# Patient Record
Sex: MALE | Race: WHITE | NOT HISPANIC OR LATINO | Employment: OTHER | URBAN - METROPOLITAN AREA
[De-identification: names, ages, dates, MRNs, and addresses within clinical notes are randomized per-mention and may not be internally consistent; named-entity substitution may affect disease eponyms.]

---

## 2017-04-28 ENCOUNTER — ANESTHESIA EVENT (OUTPATIENT)
Dept: SURGERY | Facility: HOSPITAL | Age: 57
End: 2017-04-28
Payer: MEDICAID

## 2017-04-28 ENCOUNTER — HOSPITAL ENCOUNTER (OUTPATIENT)
Facility: HOSPITAL | Age: 57
Discharge: HOME | End: 2017-04-28
Attending: EMERGENCY MEDICINE | Admitting: ORTHOPAEDIC SURGERY
Payer: MEDICAID

## 2017-04-28 ENCOUNTER — ANESTHESIA (OUTPATIENT)
Dept: SURGERY | Facility: HOSPITAL | Age: 57
End: 2017-04-28
Payer: MEDICAID

## 2017-04-28 ENCOUNTER — SURGERY (OUTPATIENT)
Age: 57
End: 2017-04-28

## 2017-04-28 VITALS
OXYGEN SATURATION: 100 % | BODY MASS INDEX: 28.14 KG/M2 | WEIGHT: 175.06 LBS | DIASTOLIC BLOOD PRESSURE: 91 MMHG | RESPIRATION RATE: 22 BRPM | HEART RATE: 81 BPM | SYSTOLIC BLOOD PRESSURE: 167 MMHG | HEIGHT: 66 IN | TEMPERATURE: 98 F

## 2017-04-28 DIAGNOSIS — S66.922A LACERATION OF LEFT WRIST WITH TENDON INVOLVEMENT, INITIAL ENCOUNTER: Primary | ICD-10-CM

## 2017-04-28 DIAGNOSIS — T14.90XA TRAUMA: ICD-10-CM

## 2017-04-28 DIAGNOSIS — S61.512A LACERATION OF LEFT WRIST WITH TENDON INVOLVEMENT, INITIAL ENCOUNTER: Primary | ICD-10-CM

## 2017-04-28 LAB
ALBUMIN SERPL BCP-MCNC: 4 G/DL
ALP SERPL-CCNC: 56 U/L
ALT SERPL W/O P-5'-P-CCNC: 16 U/L
ANION GAP SERPL CALC-SCNC: 9 MMOL/L
APTT BLDCRRT: 23.6 SEC
AST SERPL-CCNC: 22 U/L
BASOPHILS # BLD AUTO: 0.05 K/UL
BASOPHILS NFR BLD: 0.5 %
BILIRUB SERPL-MCNC: 0.6 MG/DL
BUN SERPL-MCNC: 13 MG/DL
CALCIUM SERPL-MCNC: 9.5 MG/DL
CHLORIDE SERPL-SCNC: 108 MMOL/L
CO2 SERPL-SCNC: 25 MMOL/L
CREAT SERPL-MCNC: 1.1 MG/DL
DIFFERENTIAL METHOD: ABNORMAL
EOSINOPHIL # BLD AUTO: 0.3 K/UL
EOSINOPHIL NFR BLD: 2.7 %
ERYTHROCYTE [DISTWIDTH] IN BLOOD BY AUTOMATED COUNT: 12.9 %
EST. GFR  (AFRICAN AMERICAN): >60 ML/MIN/1.73 M^2
EST. GFR  (NON AFRICAN AMERICAN): >60 ML/MIN/1.73 M^2
GLUCOSE SERPL-MCNC: 105 MG/DL
HCT VFR BLD AUTO: 39.9 %
HGB BLD-MCNC: 14.2 G/DL
INR PPP: 1
LYMPHOCYTES # BLD AUTO: 4 K/UL
LYMPHOCYTES NFR BLD: 37.7 %
MCH RBC QN AUTO: 30.6 PG
MCHC RBC AUTO-ENTMCNC: 35.6 %
MCV RBC AUTO: 86 FL
MONOCYTES # BLD AUTO: 0.8 K/UL
MONOCYTES NFR BLD: 8 %
NEUTROPHILS # BLD AUTO: 5.4 K/UL
NEUTROPHILS NFR BLD: 50.9 %
PLATELET # BLD AUTO: 286 K/UL
PMV BLD AUTO: 10.2 FL
POTASSIUM SERPL-SCNC: 4.2 MMOL/L
PROT SERPL-MCNC: 7.1 G/DL
PROTHROMBIN TIME: 10.1 SEC
RBC # BLD AUTO: 4.64 M/UL
SODIUM SERPL-SCNC: 142 MMOL/L
WBC # BLD AUTO: 10.52 K/UL

## 2017-04-28 PROCEDURE — 25000003 PHARM REV CODE 250: Performed by: EMERGENCY MEDICINE

## 2017-04-28 PROCEDURE — 90471 IMMUNIZATION ADMIN: CPT | Performed by: EMERGENCY MEDICINE

## 2017-04-28 PROCEDURE — 37000009 HC ANESTHESIA EA ADD 15 MINS: Performed by: ORTHOPAEDIC SURGERY

## 2017-04-28 PROCEDURE — 25000003 PHARM REV CODE 250: Performed by: NURSE ANESTHETIST, CERTIFIED REGISTERED

## 2017-04-28 PROCEDURE — 85610 PROTHROMBIN TIME: CPT

## 2017-04-28 PROCEDURE — 80053 COMPREHEN METABOLIC PANEL: CPT

## 2017-04-28 PROCEDURE — D9220A PRA ANESTHESIA: Mod: CRNA,,, | Performed by: NURSE ANESTHETIST, CERTIFIED REGISTERED

## 2017-04-28 PROCEDURE — 63600175 PHARM REV CODE 636 W HCPCS: Performed by: EMERGENCY MEDICINE

## 2017-04-28 PROCEDURE — 63600175 PHARM REV CODE 636 W HCPCS

## 2017-04-28 PROCEDURE — 71000033 HC RECOVERY, INTIAL HOUR: Performed by: ORTHOPAEDIC SURGERY

## 2017-04-28 PROCEDURE — 96375 TX/PRO/DX INJ NEW DRUG ADDON: CPT

## 2017-04-28 PROCEDURE — 36000706: Performed by: ORTHOPAEDIC SURGERY

## 2017-04-28 PROCEDURE — 99284 EMERGENCY DEPT VISIT MOD MDM: CPT | Mod: 25

## 2017-04-28 PROCEDURE — G0378 HOSPITAL OBSERVATION PER HR: HCPCS

## 2017-04-28 PROCEDURE — 85730 THROMBOPLASTIN TIME PARTIAL: CPT

## 2017-04-28 PROCEDURE — 90715 TDAP VACCINE 7 YRS/> IM: CPT | Performed by: EMERGENCY MEDICINE

## 2017-04-28 PROCEDURE — 96365 THER/PROPH/DIAG IV INF INIT: CPT

## 2017-04-28 PROCEDURE — 36000707: Performed by: ORTHOPAEDIC SURGERY

## 2017-04-28 PROCEDURE — 85025 COMPLETE CBC W/AUTO DIFF WBC: CPT

## 2017-04-28 PROCEDURE — 37000008 HC ANESTHESIA 1ST 15 MINUTES: Performed by: ORTHOPAEDIC SURGERY

## 2017-04-28 PROCEDURE — D9220A PRA ANESTHESIA: Mod: ANES,,, | Performed by: ANESTHESIOLOGY

## 2017-04-28 PROCEDURE — 63600175 PHARM REV CODE 636 W HCPCS: Performed by: NURSE ANESTHETIST, CERTIFIED REGISTERED

## 2017-04-28 RX ORDER — ONDANSETRON 2 MG/ML
INJECTION INTRAMUSCULAR; INTRAVENOUS
Status: DISCONTINUED | OUTPATIENT
Start: 2017-04-28 | End: 2017-04-28

## 2017-04-28 RX ORDER — CEPHALEXIN 500 MG/1
500 CAPSULE ORAL EVERY 6 HOURS
Qty: 40 CAPSULE | Refills: 0 | Status: SHIPPED | OUTPATIENT
Start: 2017-04-28 | End: 2023-06-28

## 2017-04-28 RX ORDER — GLYCOPYRROLATE 0.2 MG/ML
INJECTION INTRAMUSCULAR; INTRAVENOUS
Status: DISCONTINUED | OUTPATIENT
Start: 2017-04-28 | End: 2017-04-28

## 2017-04-28 RX ORDER — OXYCODONE AND ACETAMINOPHEN 7.5; 325 MG/1; MG/1
1 TABLET ORAL EVERY 4 HOURS PRN
Qty: 40 TABLET | Refills: 0 | Status: SHIPPED | OUTPATIENT
Start: 2017-04-28 | End: 2017-05-08

## 2017-04-28 RX ORDER — PROPOFOL 10 MG/ML
VIAL (ML) INTRAVENOUS
Status: DISCONTINUED | OUTPATIENT
Start: 2017-04-28 | End: 2017-04-28

## 2017-04-28 RX ORDER — LORAZEPAM 2 MG/ML
0.25 INJECTION INTRAMUSCULAR ONCE AS NEEDED
Status: CANCELLED | OUTPATIENT
Start: 2017-04-28 | End: 2017-04-28

## 2017-04-28 RX ORDER — METOCLOPRAMIDE HYDROCHLORIDE 5 MG/ML
10 INJECTION INTRAMUSCULAR; INTRAVENOUS EVERY 10 MIN PRN
Status: CANCELLED | OUTPATIENT
Start: 2017-04-28

## 2017-04-28 RX ORDER — MORPHINE SULFATE 10 MG/ML
6 INJECTION INTRAMUSCULAR; INTRAVENOUS; SUBCUTANEOUS
Status: COMPLETED | OUTPATIENT
Start: 2017-04-28 | End: 2017-04-28

## 2017-04-28 RX ORDER — CEFAZOLIN SODIUM 1 G/50ML
SOLUTION INTRAVENOUS
Status: DISCONTINUED
Start: 2017-04-28 | End: 2017-04-28 | Stop reason: WASHOUT

## 2017-04-28 RX ORDER — METOCLOPRAMIDE HYDROCHLORIDE 5 MG/ML
INJECTION INTRAMUSCULAR; INTRAVENOUS
Status: DISCONTINUED | OUTPATIENT
Start: 2017-04-28 | End: 2017-04-28

## 2017-04-28 RX ORDER — BISACODYL 10 MG
10 SUPPOSITORY, RECTAL RECTAL DAILY PRN
Status: DISCONTINUED | OUTPATIENT
Start: 2017-04-28 | End: 2017-04-29 | Stop reason: HOSPADM

## 2017-04-28 RX ORDER — DIPHENHYDRAMINE HYDROCHLORIDE 50 MG/ML
25 INJECTION INTRAMUSCULAR; INTRAVENOUS EVERY 6 HOURS PRN
Status: CANCELLED | OUTPATIENT
Start: 2017-04-28

## 2017-04-28 RX ORDER — CEFAZOLIN SODIUM 1 G/50ML
1 SOLUTION INTRAVENOUS ONCE
Status: COMPLETED | OUTPATIENT
Start: 2017-04-28 | End: 2017-04-28

## 2017-04-28 RX ORDER — HYDROMORPHONE HYDROCHLORIDE 2 MG/ML
0.2 INJECTION, SOLUTION INTRAMUSCULAR; INTRAVENOUS; SUBCUTANEOUS EVERY 5 MIN PRN
Status: CANCELLED | OUTPATIENT
Start: 2017-04-28

## 2017-04-28 RX ORDER — CEFAZOLIN SODIUM 2 G/50ML
SOLUTION INTRAVENOUS
Status: COMPLETED
Start: 2017-04-28 | End: 2017-04-28

## 2017-04-28 RX ORDER — HYDROMORPHONE HYDROCHLORIDE 2 MG/ML
1 INJECTION, SOLUTION INTRAMUSCULAR; INTRAVENOUS; SUBCUTANEOUS
Status: COMPLETED | OUTPATIENT
Start: 2017-04-28 | End: 2017-04-28

## 2017-04-28 RX ORDER — MEPERIDINE HYDROCHLORIDE 50 MG/ML
12.5 INJECTION INTRAMUSCULAR; INTRAVENOUS; SUBCUTANEOUS ONCE AS NEEDED
Status: CANCELLED | OUTPATIENT
Start: 2017-04-28 | End: 2017-04-28

## 2017-04-28 RX ORDER — FENTANYL CITRATE 50 UG/ML
INJECTION, SOLUTION INTRAMUSCULAR; INTRAVENOUS
Status: DISCONTINUED | OUTPATIENT
Start: 2017-04-28 | End: 2017-04-28

## 2017-04-28 RX ORDER — LIDOCAINE HCL/PF 100 MG/5ML
SYRINGE (ML) INTRAVENOUS
Status: DISCONTINUED | OUTPATIENT
Start: 2017-04-28 | End: 2017-04-28

## 2017-04-28 RX ORDER — MIDAZOLAM HYDROCHLORIDE 1 MG/ML
INJECTION, SOLUTION INTRAMUSCULAR; INTRAVENOUS
Status: DISCONTINUED | OUTPATIENT
Start: 2017-04-28 | End: 2017-04-28

## 2017-04-28 RX ORDER — PANTOPRAZOLE SODIUM 40 MG/10ML
40 INJECTION, POWDER, LYOPHILIZED, FOR SOLUTION INTRAVENOUS DAILY
Status: DISCONTINUED | OUTPATIENT
Start: 2017-04-29 | End: 2017-04-29 | Stop reason: HOSPADM

## 2017-04-28 RX ORDER — MORPHINE SULFATE 10 MG/ML
4 INJECTION INTRAMUSCULAR; INTRAVENOUS; SUBCUTANEOUS EVERY 4 HOURS PRN
Status: DISCONTINUED | OUTPATIENT
Start: 2017-04-28 | End: 2017-04-29 | Stop reason: HOSPADM

## 2017-04-28 RX ORDER — SODIUM CHLORIDE, SODIUM LACTATE, POTASSIUM CHLORIDE, CALCIUM CHLORIDE 600; 310; 30; 20 MG/100ML; MG/100ML; MG/100ML; MG/100ML
INJECTION, SOLUTION INTRAVENOUS CONTINUOUS PRN
Status: DISCONTINUED | OUTPATIENT
Start: 2017-04-28 | End: 2017-04-28

## 2017-04-28 RX ORDER — HYDROCODONE BITARTRATE AND ACETAMINOPHEN 5; 325 MG/1; MG/1
1 TABLET ORAL EVERY 4 HOURS PRN
Status: DISCONTINUED | OUTPATIENT
Start: 2017-04-28 | End: 2017-04-29 | Stop reason: HOSPADM

## 2017-04-28 RX ORDER — SODIUM CHLORIDE 9 MG/ML
INJECTION, SOLUTION INTRAVENOUS CONTINUOUS
Status: DISCONTINUED | OUTPATIENT
Start: 2017-04-28 | End: 2017-04-29 | Stop reason: HOSPADM

## 2017-04-28 RX ADMIN — FENTANYL CITRATE 25 MCG: 50 INJECTION INTRAMUSCULAR; INTRAVENOUS at 09:04

## 2017-04-28 RX ADMIN — MIDAZOLAM HYDROCHLORIDE 2 MG: 1 INJECTION, SOLUTION INTRAMUSCULAR; INTRAVENOUS at 08:04

## 2017-04-28 RX ADMIN — GLYCOPYRROLATE 0.2 MG: 0.2 INJECTION, SOLUTION INTRAMUSCULAR; INTRAVENOUS at 08:04

## 2017-04-28 RX ADMIN — SODIUM CHLORIDE: 0.9 INJECTION, SOLUTION INTRAVENOUS at 04:04

## 2017-04-28 RX ADMIN — SODIUM CHLORIDE, SODIUM LACTATE, POTASSIUM CHLORIDE, AND CALCIUM CHLORIDE: .6; .31; .03; .02 INJECTION, SOLUTION INTRAVENOUS at 08:04

## 2017-04-28 RX ADMIN — CLOSTRIDIUM TETANI TOXOID ANTIGEN (FORMALDEHYDE INACTIVATED), CORYNEBACTERIUM DIPHTHERIAE TOXOID ANTIGEN (FORMALDEHYDE INACTIVATED), BORDETELLA PERTUSSIS TOXOID ANTIGEN (GLUTARALDEHYDE INACTIVATED), BORDETELLA PERTUSSIS FILAMENTOUS HEMAGGLUTININ ANTIGEN (FORMALDEHYDE INACTIVATED), BORDETELLA PERTUSSIS PERTACTIN ANTIGEN, AND BORDETELLA PERTUSSIS FIMBRIAE 2/3 ANTIGEN 0.5 ML: 5; 2; 2.5; 5; 3; 5 INJECTION, SUSPENSION INTRAMUSCULAR at 02:04

## 2017-04-28 RX ADMIN — METOCLOPRAMIDE 10 MG: 5 INJECTION, SOLUTION INTRAMUSCULAR; INTRAVENOUS at 08:04

## 2017-04-28 RX ADMIN — FENTANYL CITRATE 50 MCG: 50 INJECTION INTRAMUSCULAR; INTRAVENOUS at 08:04

## 2017-04-28 RX ADMIN — LIDOCAINE HYDROCHLORIDE 100 MG: 20 INJECTION, SOLUTION INTRAVENOUS at 08:04

## 2017-04-28 RX ADMIN — CEFAZOLIN SODIUM 2 G: 2 SOLUTION INTRAVENOUS at 09:04

## 2017-04-28 RX ADMIN — FENTANYL CITRATE 50 MCG: 50 INJECTION INTRAMUSCULAR; INTRAVENOUS at 10:04

## 2017-04-28 RX ADMIN — PROPOFOL 20 MG: 10 INJECTION, EMULSION INTRAVENOUS at 09:04

## 2017-04-28 RX ADMIN — ONDANSETRON 4 MG: 2 INJECTION, SOLUTION INTRAMUSCULAR; INTRAVENOUS at 10:04

## 2017-04-28 RX ADMIN — SODIUM CHLORIDE, SODIUM LACTATE, POTASSIUM CHLORIDE, AND CALCIUM CHLORIDE: .6; .31; .03; .02 INJECTION, SOLUTION INTRAVENOUS at 10:04

## 2017-04-28 RX ADMIN — MORPHINE SULFATE 6 MG: 10 INJECTION INTRAVENOUS at 02:04

## 2017-04-28 RX ADMIN — CEFAZOLIN SODIUM 1 G: 1 SOLUTION INTRAVENOUS at 02:04

## 2017-04-28 RX ADMIN — HYDROMORPHONE HYDROCHLORIDE 1 MG: 2 INJECTION INTRAMUSCULAR; INTRAVENOUS; SUBCUTANEOUS at 03:04

## 2017-04-28 RX ADMIN — FENTANYL CITRATE 50 MCG: 50 INJECTION INTRAMUSCULAR; INTRAVENOUS at 09:04

## 2017-04-28 RX ADMIN — PROPOFOL 180 MG: 10 INJECTION, EMULSION INTRAVENOUS at 08:04

## 2017-04-28 NOTE — ED PROVIDER NOTES
"Encounter Date: 4/28/2017    SCRIBE #1 NOTE: I, Emelina Mcnamara, am scribing for, and in the presence of,  Ilana Mayes MD. I have scribed the following portions of the note - Other sections scribed: HPI, ROS.       History     Chief Complaint   Patient presents with    Laceration     " The ceramic from the toilet broke and sliced my wrist open ( left)."     Review of patient's allergies indicates:  No Known Allergies  HPI Comments: CC: Laceration    HPI: 56 year old male, smoker with no reported PMHx presents to the ED for emergent evaluation of a laceration to the left volar, ulnar wrist with exposed tendon and muscle belly. Patient reports he was moving a toilet bowl that cracked and "sliced his wrist open" just PTA. Pain is constant and severe (10/10). No reported prior treatment. Pain is exacerbated when patient tries to make a fist stating "it feels like a knot is in my wrist". Patient unsure of last tetanus. Patient otherwise denies nausea, vomiting and other symptoms.       The history is provided by the patient. No  was used.     History reviewed. No pertinent past medical history.  History reviewed. No pertinent surgical history.  History reviewed. No pertinent family history.  Social History   Substance Use Topics    Smoking status: Current Every Day Smoker     Packs/day: 1.00    Smokeless tobacco: None    Alcohol use No     Review of Systems   Constitutional: Negative for chills and fever.   HENT: Negative for ear pain, rhinorrhea and sore throat.    Eyes: Negative for pain.   Respiratory: Negative for cough and shortness of breath.    Cardiovascular: Negative for chest pain.   Gastrointestinal: Negative for abdominal pain, diarrhea, nausea and vomiting.   Genitourinary: Negative for dysuria.   Musculoskeletal: Negative for back pain and neck pain.   Skin: Negative for rash.        (+) Laceration (left ulnar wrist)   Neurological: Negative for headaches. "       Physical Exam   Initial Vitals   BP Pulse Resp Temp SpO2   04/28/17 1424 04/28/17 1424 04/28/17 1424 04/28/17 1424 04/28/17 1424   85/45 79 22 98.2 °F (36.8 °C) 95 %     Physical Exam    Constitutional: He appears well-developed and well-nourished.   Uncomfortable, in moderate pain.   HENT:   Head: Normocephalic.   Eyes: Conjunctivae and EOM are normal.   Neck: Neck supple.   Cardiovascular: Normal rate, regular rhythm and normal heart sounds. Exam reveals no gallop and no friction rub.    No murmur heard.  Pulmonary/Chest: Breath sounds normal. No stridor. He has no wheezes. He has no rhonchi. He has no rales.   Abdominal: Soft. Bowel sounds are normal. He exhibits no distension and no pulsatile midline mass. There is no tenderness. There is no rebound and no guarding.   Musculoskeletal: Normal range of motion.   Large laceration to left ulnar wrist with exposed muscle and tendon.  2+ radial pulse present on the left.  Patient still has full range of motion of all fingers, and flexion of left wrist, although limited due to pain.   Neurological: He is alert and oriented to person, place, and time. He has normal strength. No cranial nerve deficit.   Skin: Skin is warm and dry.   Large irregular 10 cm gaping laceration to left ulnar wrist involving muscle and tendon.         ED Course   Procedures  Labs Reviewed - No data to display          Medical Decision Making:   History:   Old Medical Records: I decided to obtain old medical records.  56 y.o. presents with large macerated laceration to left ulnar wrist with involved tendon and muscle belly after injuring it on a broken toilet.  Patient initially hypotensive at triage, however repeat blood pressure in ED room systolic 120s.  Left wrist x-rays independently interpreted by me show no bony involvement, however there appears to be possible retained foreign body.  Patient has complicated laceration that will require repair and likely washout in OR.  IV Ancef  given.  Tetanus updated in ED.  Orthopedist consulted.  I spoke to Dr. Cook.  Patient will be admitted for washout and repair.  Pain controlled with IV morphine, then IV Dilaudid.  Preop labs ordered.  Patient informed of admission and agrees.            Scribe Attestation:   Scribe #1: I performed the above scribed service and the documentation accurately describes the services I performed. I attest to the accuracy of the note.    Attending Attestation:           Physician Attestation for Scribe:  Physician Attestation Statement for Scribe #1: I, Ilana Mayes MD , reviewed documentation, as scribed by Emelina Mcnamara in my presence, and it is both accurate and complete.                 ED Course     Clinical Impression:   The primary encounter diagnosis was Laceration of left wrist with tendon involvement, initial encounter. A diagnosis of Trauma was also pertinent to this visit.          Ilana Mayes MD  04/28/17 8988       Ilana Mayes MD  04/28/17 6710

## 2017-04-28 NOTE — PLAN OF CARE
Problem: Patient Care Overview  Goal: Plan of Care Review  Outcome: Ongoing (interventions implemented as appropriate)  Patient is aware of having surgery tonight.  Pain Management is ongoing, pt receiving dilaudid ivp for pain, which he states is not effective.

## 2017-04-28 NOTE — ED TRIAGE NOTES
"Pt comes to the ED with 7 X 5 cm laceration to (L) wrist with exposed tendons, ligaments muscle and visible pulsating artery. Pt not actively bleeding. Pt states " I was bringing an old toilet out for the trash and the top of the bowl cracked and cut my (L) wrist about 20 mins ago. Pt presents to the ED diaphoretic and in severe pain. Will continue to monitor.  "

## 2017-04-28 NOTE — ANESTHESIA PREPROCEDURE EVALUATION
04/28/2017  Claude R Morris Jr. is a 56 y.o., male.    Anesthesia Evaluation    I have reviewed the Patient Summary Reports.     I have reviewed the Medications.     Review of Systems  Anesthesia Hx:  Neg history of prior surgery.   Social:  Smoker    Hematology/Oncology:  Hematology Normal        Cardiovascular:  Cardiovascular Normal     Pulmonary:  Pulmonary Normal    Renal/:  Renal/ Normal     Hepatic/GI:  Hepatic/GI Normal    Endocrine:  Endocrine Normal        Physical Exam  General:  Well nourished    Airway/Jaw/Neck:  Airway Findings: Mouth Opening: Normal Tongue: Normal  General Airway Assessment: Adult  Mallampati: II  TM Distance: 4 - 6 cm  Jaw/Neck Findings:  Neck ROM: Normal ROM      Dental:  Dental Findings: In tact   Chest/Lungs:  Chest/Lungs Findings: Normal Respiratory Rate     Heart/Vascular:  Heart Findings: Rate: Normal        Mental Status:  Mental Status Findings:  Cooperative         Anesthesia Plan  Type of Anesthesia, risks & benefits discussed:  Anesthesia Type:  general  Patient's Preference:   Intra-op Monitoring Plan: standard ASA monitors  Intra-op Monitoring Plan Comments:   Post Op Pain Control Plan:   Post Op Pain Control Plan Comments:   Induction:   IV  Beta Blocker:  Patient is not currently on a Beta-Blocker (No further documentation required).       Informed Consent: Patient understands risks and agrees with Anesthesia plan.  Questions answered. Anesthesia consent signed with patient.  ASA Score: 2     Day of Surgery Review of History & Physical:    H&P update referred to the provider.         Ready For Surgery From Anesthesia Perspective.

## 2017-04-28 NOTE — IP AVS SNAPSHOT
[place] [address] [place] [address]  Phone: [phone]           Patient Discharge Instructions   Our goal is to set you up for success. This packet includes information on your condition, medications, and your home care.  It will help you care for yourself to prevent having to return to the hospital.     Please ask your nurse if you have any questions.      There are many details to remember when preparing to leave the hospital. Here is what you will need to do:    1. Take your medicine. If you are prescribed medications, review your Medication List on the following pages. You may have new medications to  at the pharmacy and others that you'll need to stop taking. Review the instructions for how and when to take your medications. Talk with your doctor or nurses if you are unsure of what to do.     2. Go to your follow-up appointments. Specific follow-up information is listed in the following pages. Your may be contacted by a nurse or clinical provider about future appointments. Be sure we have all of the phone numbers to reach you. Please contact your provider's office if you are unable to make an appointment.     3. Watch for warning signs. Your doctor or nurse will give you detailed warning signs to watch for and when to call for assistance. These instructions may also include educational information about your condition. If you experience any of warning signs to your health, call your doctor.               ** Verify the list of medication(s) below is accurate and up to date. Carry this with you in case of emergency. If your medications have changed, please notify your healthcare provider.             Medication List      START taking these medications        Additional Info                      cephALEXin 500 MG capsule   Commonly known as:  KEFLEX   Quantity:  40 capsule   Refills:  0   Dose:  500 mg    Instructions:  Take 1 capsule (500 mg total) by mouth every 6  (six) hours.     Begin Date    AM    Noon    PM    Bedtime       oxycodone-acetaminophen 7.5-325 mg per tablet   Commonly known as:  PERCOCET   Quantity:  40 tablet   Refills:  0   Dose:  1 tablet    Instructions:  Take 1 tablet by mouth every 4 (four) hours as needed for Pain.     Begin Date    AM    Noon    PM    Bedtime            Where to Get Your Medications      You can get these medications from any pharmacy     Bring a paper prescription for each of these medications     cephALEXin 500 MG capsule    oxycodone-acetaminophen 7.5-325 mg per tablet                  Please bring to all follow up appointments:    1. A copy of your discharge instructions.  2. All medicines you are currently taking in their original bottles.  3. Identification and insurance card.    Please arrive 15 minutes ahead of scheduled appointment time.    Please call 24 hours in advance if you must reschedule your appointment and/or time.        Follow-up Information     Follow up with Bernie Cook III, MD In 2 weeks.    Specialty:  Orthopedic Surgery    Contact information:    [address] [place] [address]  [address]  [place] [id number] [phone]          Discharge Instructions     Future Orders    Activity as tolerated     Diet general     Questions:    Total calories:      Fat restriction, if any:      Protein restriction, if any:      Na restriction, if any:      Fluid restriction:      Additional restrictions:      Leave dressing on - Keep it clean, dry, and intact until clinic visit       Discharge References/Attachments     LACERATION, GENERAL (CHILD) (ENGLISH)    LACERATION, HAND WITH POSSIBLE NERVE INJURY: SUTURES, GLUE (ENGLISH)        Primary Diagnosis     Your primary diagnosis was:  Open Wound Of Wrist      Admission Information     Date & Time Provider Department [facility]    4/28/2017  2:28 PM Bernie Cook III, MD [facility] [id number]      Care Providers     Provider Role Specialty Primary office phone    Bernie NEWMAN  "Joey MCKEON MD Attending Provider Orthopedic Surgery [phone]    [place] QBryant Cook III, MD Surgeon  Orthopedic Surgery [phone]      Your Vitals Were     BP Pulse Temp Resp Height Weight    152/105 94 98.4 °F (36.9 °C) (Oral) 22 5' 6" (1.676 m) 79.4 kg (175 lb 0.7 oz)    SpO2 BMI             95% 28.25 kg/m2         Recent Lab Values     No lab values to display.      Allergies as of 4/28/2017     No Known Allergies      [facility]     [facility] Nurse Care Line - 24/7 Assistance  Unless otherwise directed by your provider, please contact [facility], our nurse care line that is available for 24/7 assistance.     Registered nurses in the [facility] provide clinical advisement, health education, appointment booking, and other advisory services.  Call for this free service at [phone].        Advance Directives     An advance directive is a document which, in the event you are no longer able to make decisions for yourself, tells your healthcare team what kind of treatment you do or do not want to receive, or who you would like to make those decisions for you.  If you do not currently have an advance directive, [facility] encourages you to create one.  For more information call:  [phone] ([phone]), [phone] ([phone]),  or log on to [email or web address].        Smoking Cessation     If you would like to quit smoking:   You may be eligible for free services if you are a [place] resident and started smoking cigarettes before September 1, 1988.  Call the Smoking Cessation Trust (SCT) toll free at (556) 556-7768 or (134) 496-9134.   Call 4-362-QUIT-NOW if you do not meet the above criteria.   Contact us via email: [email or web address]   View our website for more information: [email or web address]        Language Assistance Services     ATTENTION: Language assistance services are available, free of charge. Please call [phone].      ATENCIÓN: Si " habla español, tiene a paige disposición servicios gratuitos de asistencia lingüística. Llame al [phone].     CHÚ Ý: N?u b?n nói Ti?ng Vi?t, có các d?ch v? h? tr? ngôn ng? mi?n phí dành cho b?n. G?i s? [phone].         [facility] complies with applicable Federal civil rights laws and does not discriminate on the basis of race, color, national origin, age, disability, or sex.

## 2017-04-29 NOTE — OP NOTE
DATE OF PROCEDURE:  04/28/2017    SURGEON:  Bernie Cook III, M.D.    ASSISTANT:  None.    PREOPERATIVE DIAGNOSIS:  Left forearm laceration.    POSTOPERATIVE DIAGNOSIS:  Left forearm laceration.    PROCEDURES:  1.  Irrigation and debridement, left forearm laceration.  2.  Repair of small finger flexor digitorum profundus.  3.  Repair of small finger flexor digitorum superficialis.  4.  Repair of left ring finger flexor digitorum superficialis.  5.  Repair of middle finger flexor digitorum superficialis.  6.  Repair of index finger flexor digitorum superficialis.  7.  Repair of palmaris longus.  8.  Repair of flexor carpi ulnaris.    INDICATION:  The patient is a 56-year-old male with a history of a laceration to   his left forearm, suffered a complex wound in his ulnar half border of his left   forearm distally.  He was evaluated by the Emergency Room.  He was admitted and   taken to Surgery for repair.    PROCEDURE IN DETAIL:  After proper informed consent was obtained, the patient   was brought to the operating room and placed supine on the operative table.    General anesthesia was induced.  Left arm was prepped and draped in sterile   fashion.  Arm was exsanguinated.  The tourniquet inflated to 250 mmHg.  Wound   was inspected and found to be with laceration of the ulnar side of the forearm   distally with involvement of the multiple flexor tendons including the FCU,   palmaris longus, FDS to all forefingers and the small finger and ring finger   FDP.  The thorough debridement was carried out with normal saline, pulse lavage   normal saline and pickups to remove foreign matter, which was not too much.    Following thorough irrigation and debridement, which is excisional removing some   of the skin margins that were nonviable.  The repair was carried out of the   small finger and ring finger FDP with 4-0 FiberWire suture.  The index finger   FDS was repaired with 2-0 Vicryl suture in the muscle belly and then  the tendons   were repaired with 4-0 FiberWire suture.  The ring finger and small finger FDS   repaired with 4-0 FiberWire suture, both in a modified Neri and oblique   figure-of-eight repair, particularly over the ring finger FDS.  Attention was   then turned to the FCU and the palmaris, they were apposed and the palmaris   longus was repaired with 4-0 FiberWire suture and Neri suture and then a   figure-of-eight and then the flexor carpi ulnaris was repaired with a 2-0   Ethibond suture and 2-0 Vicryl in the muscle belly.  The median nerve and ulnar   nerve were inspected and found to be intact.  The wound was then thoroughly   irrigated.  Tourniquet was deflated.  Hemostasis was adequate.  The laceration   in the distal and exposure incision were approximated with staples.  He was   placed in a sterile dressing and a dorsal splint in very mild wrist flexion and   MP flexion, especially for the ulnar side.  He was taken to Recovery in stable   condition after extubation.      MECCA  dd: 04/28/2017 22:21:26 (CDT)  td: 04/29/2017 01:52:52 (CD)  Doc ID   #[id number]  Job ID #[id number]    CC:

## 2017-04-29 NOTE — BRIEF OP NOTE
Operative Note     SUMMARY     Surgery Date: 4/28/2017     Surgeon(s) and Role:     * Bernie Cook III, MD - Primary    Pre-op Diagnosis:  L distal forearm laceration    Post-op Diagnosis:  L distal forearm laceration    PROC:  I&D L FA wound  Repair FDP SF/RF, FDS IF/MF/RF/SF, PL, FCU    Anesthesia: General    Findings/Key Components:      Estimated Blood Loss: min          Specimens     None            Discharge Note      SUMMARY     Admit Date: 4/28/2017    Attending Physician: Bernie Cook III, MD     Discharge Physician: Bernie Cook III, MD    Discharge Date:  4/28/2017      Final Diagnosis:  Wrist laceration [S61.519A]    Outcome of Case and Hospitilization:  Improved    Disposition:  Home / Self Care    Patient Instructions:   Current Discharge Medication List      START taking these medications    Details   cephALEXin (KEFLEX) 500 MG capsule Take 1 capsule (500 mg total) by mouth every 6 (six) hours.  Qty: 40 capsule, Refills: 0      oxycodone-acetaminophen (PERCOCET) 7.5-325 mg per tablet Take 1 tablet by mouth every 4 (four) hours as needed for Pain.  Qty: 40 tablet, Refills: 0             Discharge Procedure Orders (must include Diet, Follow-up, Activity)    Discharge Procedure Orders (must include Diet, Follow-up, Activity)  Diet general     Activity as tolerated     Leave dressing on - Keep it clean, dry, and intact until clinic visit          Follow-up Information     Follow up with Bernie Cook III, MD In 2 weeks.    Specialty:  Orthopedic Surgery    Contact information:    [address]  [address]  [place] [id number] [phone]

## 2017-04-29 NOTE — NURSING
Pt arrived to floor from PACU. Vital signs WNL. Discharge instructions given to pt and family member. Also given list of nearby 24 hour pharmacies. Instructed to come to ER with any signs/symptoms of infection (fever, redness, swelling, purulent discharge), uncontrolled pain or bleeding. Patient and sister both verbalized understanding. IV discontinued. Transported via wheelchair to private vehicle.

## 2017-04-29 NOTE — H&P
ADMITTING PHYSICIAN:  Bernie Cook III, M.D.    CHIEF COMPLAINT:  Left arm laceration.    HISTORY OF PRESENT ILLNESS:  The patient is a 56-year-old male with a history of   injury to his left forearm prior to admission where he was putting a cracked   toilet bowl over to the street.  It broke on his leg and down and sliced his   wrist open.  He presented to the Emergency Room with a large laceration on the   ulnar aspect of his wrist with exposed tendon and muscle.  He was admitted with   plans made for surgery.    PAST SURGICAL HISTORY:  Negative.    PAST MEDICAL HISTORY:  Negative.  He is a smoker.    SOCIAL HISTORY:  Reveals he smokes.  He does not consume alcohol.    REVIEW OF SYSTEMS:  Negative except for his wrist.    PHYSICAL EXAMINATION:  GENERAL:  Reveals he is an alert male in bed.  HEART:  Has regular rate and rhythm.  CHEST:  Clear.  ABDOMEN:  Soft and nontender.  EXTREMITIES:  He has a laceration on his left arm, which has been dressed with a   sterile dressing.  Sensible light touch is present throughout his fingers.  He   is able to abduct and adduct his digits normally.  He has pain with flexion of   his fingers.  He is able to flex them some; however, and extend them.  Sensible   light touch is present through all.  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  His right arm is unremarkable.    His x-rays reveal soft tissue defect about the left distal forearm.    ASSESSMENT:  Left arm laceration.    PLAN:  Admit him and take him to surgery for repair.      MECCA  dd: 04/28/2017 20:53:24 (CDT)  td: 04/28/2017 21:18:29 (CDT)  Doc ID   #[id number]  Job ID #[id number]    CC:

## 2017-04-29 NOTE — TRANSFER OF CARE
"Anesthesia Transfer of Care Note    Patient: Claude R Morris Jr.    Procedure(s) Performed: Procedure(s) (LRB):  REPAIR-TENDON-HAND; POSS. NERVE REPAIR (Left)    Patient location: PACU    Anesthesia Type: general    Transport from OR: Transported from OR on room air with adequate spontaneous ventilation    Post pain: adequate analgesia    Post assessment: no apparent anesthetic complications and tolerated procedure well    Post vital signs: stable    Level of consciousness: sedated and responds to stimulation    Nausea/Vomiting: no nausea/vomiting    Complications: none          Last vitals:   Visit Vitals    BP (!) 169/94 (BP Location: Right arm, Patient Position: Lying, BP Method: Automatic)    Pulse 91    Temp 36.9 °C (98.4 °F) (Oral)    Resp 15    Ht 5' 6" (1.676 m)    Wt 79.4 kg (175 lb 0.7 oz)    SpO2 100%    BMI 28.25 kg/m2     "

## 2017-04-29 NOTE — ANESTHESIA POSTPROCEDURE EVALUATION
"Anesthesia Post Evaluation    Patient: Claude R Morris Jr.    Procedure(s) Performed: Procedure(s) (LRB):  REPAIR-TENDON-HAND; POSS. NERVE REPAIR (Left)    Final Anesthesia Type: general  Patient location during evaluation: PACU  Patient participation: Yes- Able to Participate  Level of consciousness: awake  Post-procedure vital signs: reviewed and stable  Pain management: adequate  Airway patency: patent  PONV status at discharge: No PONV  Anesthetic complications: no      Cardiovascular status: stable  Respiratory status: unassisted  Hydration status: euvolemic  Follow-up not needed.        Visit Vitals    BP (!) 152/105    Pulse 94    Temp 36.9 °C (98.4 °F) (Oral)    Resp (!) 22    Ht 5' 6" (1.676 m)    Wt 79.4 kg (175 lb 0.7 oz)    SpO2 95%    BMI 28.25 kg/m2       Pain/Fabiano Score: Pain Assessment Performed: Yes (4/28/2017 10:29 PM)  Presence of Pain: denies (4/28/2017 11:15 PM)  Pain Rating Prior to Med Admin: 9 (4/28/2017  3:26 PM)  Pain Rating Post Med Admin: 7 (4/28/2017  3:56 PM)  Fabiano Score: 10 (4/28/2017 11:00 PM)      "

## 2017-05-05 ENCOUNTER — NURSE TRIAGE (OUTPATIENT)
Dept: ADMINISTRATIVE | Facility: CLINIC | Age: 57
End: 2017-05-05

## 2017-05-05 NOTE — TELEPHONE ENCOUNTER
"  Reason for Disposition   [1] Suture or staple came out early AND [2] caller wants wound checked    Answer Assessment - Initial Assessment Questions  1. SYMPTOM: "What's the main symptom you're concerned about?" (e.g., redness, pain, drainage)     Patient thinks a suture came out, he states he cannot view site because of dressing.  2. ONSET: "When did ________  start?"      today  3. SURGERY: "What surgery was performed?"      Tendon repair of hand  4. DATE of SURGERY: "When was surgery performed?"       4/28/17  5. INCISION SITE: "Where is the incision located?"       wrist  6. REDNESS: "Is there any redness at the incision site?" If yes, ask: "How wide across is the redness?" (Inches, centimeters)       Patient is unable to view  7. PAIN: "Is there any pain?" If so, ask: "How bad is it?"  (Scale 1-10; or mild, moderate, severe)      no  8. BLEEDING: "Is there any bleeding?" If so, ask: "How much?" and "Where?"      no  9. DRAINAGE: "Is there any drainage from the incision site?" If yes, ask: "What color and how much?" (e.g., red, cloudy, pus; drops, teaspoon)      no  10. FEVER: "Do you have a fever?" If so, ask: "What is your temperature, how was it measured, and when did it start?"        -  11. OTHER SYMPTOMS: "Do you have any other symptoms?" (e.g., shaking chills, weakness, rash elsewhere on body)        no    Protocols used: ST POST-OP INCISION SYMPTOMS-A-AH    "

## 2017-05-05 NOTE — TELEPHONE ENCOUNTER
Spoke to Emelina in Dr. LUISA Cook's office, have Dr. Cook contactl Bryant Jagdeep regarding suturecoming out early.

## 2017-05-12 DIAGNOSIS — S56.222D: Primary | ICD-10-CM

## 2017-05-12 DIAGNOSIS — S56.124D: ICD-10-CM

## 2017-05-12 DIAGNOSIS — S51.002D: Primary | ICD-10-CM

## 2017-05-12 DIAGNOSIS — S56.126D: ICD-10-CM

## 2017-05-12 DIAGNOSIS — S56.128D LACERATION OF FLEXOR MUSCLE, FASCIA AND TENDON OF LEFT LITTLE FINGER AT FOREARM LEVEL, SUBSEQUENT ENCOUNTER: ICD-10-CM

## 2017-05-17 ENCOUNTER — CLINICAL SUPPORT (OUTPATIENT)
Dept: REHABILITATION | Facility: HOSPITAL | Age: 57
End: 2017-05-17
Attending: ORTHOPAEDIC SURGERY
Payer: MEDICAID

## 2017-05-17 DIAGNOSIS — M79.642 HAND PAIN, LEFT: ICD-10-CM

## 2017-05-17 DIAGNOSIS — M79.89 SWELLING OF LEFT HAND: ICD-10-CM

## 2017-05-17 DIAGNOSIS — M25.649 DECREASED ROM OF FINGER: ICD-10-CM

## 2017-05-17 PROCEDURE — 97165 OT EVAL LOW COMPLEX 30 MIN: CPT | Mod: PO

## 2017-05-17 PROCEDURE — L3906 WHO W/O JOINTS CF: HCPCS | Mod: PO

## 2017-05-17 NOTE — PROGRESS NOTES
Occupational Therapy -Hand / Wrist  Evaluation    Patient: Claude R Morris Jr.  Date of Evaluation: 5/17/2017  Referring Physician: Bernie Cook III, *  Diagnosis:   1. Decreased ROM of finger     2. Hand pain, left     3. Swelling of left hand       MRN: [id number]  Age: 56 y.o.  Sex: male     Referral Orders:   Eval and treat; Fabricate custom dorsal blocking orthotic   Visits Approved: eval only    Start Time: 10  End Time: 11  Total Time: 60 min     Hand dominance: Right      Subjective     Claude is a 56 y.o. male that presents to clinic secondary to s/p Zone V flexor tendon repair in L hand. Pt lacerated his wrist on a toilet bowl. Staples have been removed.  Injury/surgery occurred on 4/28/17. Pt injured pinky a few days before laceration injury reporting that he felt a popping upon flexing pinky . Previous treatment included no therapy. Pt reports pain at worst is a 10 on the VAS. Pt uses oxycodone 7.5 as needed to control pain symptoms. No cultural, environmental, or spiritual barriers identified to treatment or learning.    Pain:  Functional Pain Scale Rating 0-10: 3/10 on average  Location: volar wrist area, needle stinging in forearm area  Description: Burning, sometimes feels like a string pulling      Functional Limitations: Patient presents with the following functional Limitations affecting ADLS, work and leisure tasks:   Previous functional status includes: Independent with all ADLs.     Current FunctionalStatus:   Exercise routine prior to onset:    DME owned:    Home/Living environment : lives with their son      Limitation of Functional Status as follows:   ADLs:     - Feeding: Independent    - Bathing: Independent    - Dressing: Assistance - can't put socks on    - Grooming: Independent      IADLs:    - managing finances: Independent    - driving/handling transportation: Independent    - shopping: Independent    - using phone:Independent    - computer:Independent    - managing medications:  Independent    - housework & home maintenance: Independent     Leisure:       Occupation:    Working presently: employed, not able to work since injury  Workmen's Compensation:  no  Duties Prior to Injury:   Unable to perform due to injury: not able to work    Past Medical History/Physical Systems Review:   No past medical history on file.    Allergies:  Review of patient's allergies indicates:  No Known Allergies    Barriers:  Environmental Concerns/ Fall Risk:  None  Barriers to Learning: None   Cultural/Spiritual : None   Developmental/Education: None   Abuse/ Neglect: none   Nutritional Deficit: None   Language: None   Hearing/Vision Deficit: None   Other: -     Objective     Observation:   Steri strips in tact  Pt arrived with hand moderately soiled wearing dorsal block cast with ace bandaging intact     Sensation:   Grossly intact      Edema: Circumferential measurements: left     Thumb Index Middle Ring Small   Proximal Phal 7.8 cm 8.1 cm 8.5 cm 7.5 cm 6.9 cm   PIP Joint 7.7 cm 7.5 cm 8.3 cm 7.4 cm 6.7 cm   Middle Phal  6.7 cm 7.1 cm 6.4 cm 6.4 cm   DIP Joint  6.8 cm 6.9 cm 6.1 cm 6.2 cm   Distal Phal 6.9 cm 6.5 cm 6.3 cm 6.1 cm 5.8 cm     We assess ROM in coming session as dictated by protocol     OUTCOME MEASURE: FOTO    Category: Self Care      Current Score  = 34% or 66% impaired  Goal at Discharge Score = 62% or 38% impaired    Score interpretation is as follows:  CL = least 60% but < 80% impaired, limited or restricted    Treatment today included: Assessed and cleaned wound. Fabricated a dorsal block orthosis to L hand/wrist/forearm to protect tendon, maintain immobilization of hand.  Instructed to wear 24 hours/day without removal.  Instructed to monitor for pain/pressure, increased edema, or redness and to contact office for adjustments as needed. Patient reported good understanding of orthotic wear and care schedule.     Assessment     This is a 56 y.o. male referred to outpatient  occupational/hand therapy and presents with a medical diagnosis of s/p L zone V flexor tendon repair and demonstrates limitations as described in the problem list/chart below. Following low medical record review it is determined that pt will benefit from occupational therapy services in order to maximize pain free and/or functional use of left hand. The following goals were discussed with the patient and patient is in agreement with them as to be addressed in the treatment plan. Pt was given a HEP consisting of orthotic wear and care and movement restrictions. Pt verbally understood the instructions as they were given. The patient's rehab potential is good.     History Examination Decision Making Complexity Score   Occupational Profile: Pt is a 57yo RHD male s/p L zone V flexor tendon repair as a result of a laceration injury. Pt works as a  and lives with his son. Pt is having difficulty with ADLs and IADLs and is unable to return to work due to performance deficits.     Comorbidities: none    Medical and Therapy History Review: low                 Performance Deficits:   - Dressing: cannot put socks on  - Cannot return to work due to injury    Physical:  Decreased ROM   Decreased  and pinch strength   Decreased muscle strength   Decreased functional hand use   Increased pain   Edema    Cognitive:  WNL    Psychosocial:    WNL     Modification of tasks during evaluation: Clinical decision making of moderate complexity, which includes an analysis of the occupational profile, analysis of date from problem focused assessments, and the consideration of several treatment options. Patient presents with comorbidities that affect occupational performance. Minimal to moderate modifications of tasks or assistance with assessments is necessary to enable completion of evaluation component.   low  Based on PMHX, co morbidities , data from assessments and functional level of assistance required with task and clinical  "presentation directly impacting           Goals: ( 8 weeks)   1)   Patient to be IND with HEP and modalities for pain management  2)   Assess ROM in 1-2 weeks.  3)   Assess  strength in 1-2 weeks.  4)   Assess pinch strength in 1-2 weeks.  5)   Decrease edema .2-.3 cm to increase joint mobility /flexibility for functional hand use.   6)   Patient to score at 62% or more on FOTO to demonstrate improved perception of functional hand use.  7)   Decrease complaints of pain to  2 out of 10 to increase functional hand use for ADL/work/leisure activities.  8)   Patient to be IND wiht Orthotic use, wear and care precautions.       Plan     Pt to be treated by Occupational Therapy 2-3 times per week for 8 weeks during the certification period from 5/17/17 to 7/14/17 to achieve the established goals.     Treatment to include: Paraffin, Fluidotherapy, Manual therapy/joint mobilizations, Modalities for pain management, US 3 mhz, Therapeutic exercises/activities., Iontophoresis with 2.0 cc Dexamethasone, Strengthening, Orthotic Fabrication/Fit/Training, Edema Control, Scar Management, Wound Care, Electrical Modalities and Joint Protection, as well as any other treatments deemed necessary based on the patient's needs or progress.                   I certify the need for these services furnished under this plan of treatment and while under my care    ____________________________________                         __________________  Physician/Referring Practitioner                                               Date of Signature              Student: PHI Ortega    " I certify that I was present in the room directing the student in service delivery and guiding them using my skilled judgement. As the co-signing therapist, I have reviewed the student's documentation and am responsible for the treatment, assessment and plan."    Therapist: EARLENE Diez CLT  "

## 2017-05-22 ENCOUNTER — CLINICAL SUPPORT (OUTPATIENT)
Dept: REHABILITATION | Facility: HOSPITAL | Age: 57
End: 2017-05-22
Attending: ORTHOPAEDIC SURGERY
Payer: MEDICAID

## 2017-05-22 DIAGNOSIS — M79.642 HAND PAIN, LEFT: ICD-10-CM

## 2017-05-22 DIAGNOSIS — M25.649 DECREASED ROM OF FINGER: ICD-10-CM

## 2017-05-22 PROCEDURE — 97530 THERAPEUTIC ACTIVITIES: CPT | Mod: PO

## 2017-05-22 NOTE — PROGRESS NOTES
"OT Daily Progress Note    Patient:  Claude R Morris Jr.  Clinic #:  [id number]   Date of Note: 05/22/2017   Referring Physician:  Bernie Cook III, *  Diagnosis:  S/p L zone V flexor tendon repair  Encounter Diagnoses   Name Primary?    Decreased ROM of finger     Hand pain, left         Start Time: 1  End Time: 1:50  Total Time: 50 min  Group Time: -    Visit 2 of 16 authorized  MEDICARE/DASH visit #2    Subjective:   "It pulled a little yesterday when I was exercising the pinky. Sometimes the pain can go up to a 10."  Pain: 5 out of 10     Objective:   Patient seen by OT this session. Treatment  consist of the following:  manual therapy/joint mobilization and Therapeutic exercise    Attempted to clean wound area and debride dried blood. Patient received MH x 10 min to L wrist to increase blood flow, circulation and tissue elasticity to wound area while performing manual therapy techniques x 30 min for passive protective ROM for DIP/PIP extension and composite flexion on L digits 2-5; L wrist PROM. Performed RM to L wrist x 3 min to decrease edema, improve joint mobility, decrease pain and improve lymphatic drainage to increase hand function. Pt instructed on scar desensitization with 2 different fabric textures and L AROM for MP, PIP, and wrist flexion within constraints of splint. Reviewed HEP (addition of AROM within constrains of dorsal blocking splint) and modality use for pain management with patient reporting good understanding of same.     Treatment: Manual therapy x 40 min, MHP x 10 min      Assessment:  Pt will continue to benefit from skilled OT intervention. Pt arrived today with L hand moderately soiled within the splint, showing signs of continued compliance with orthotic wear. Pt tolerated passive protective ROM DIP stretching well with no complaints of pain. Plan to measure AROM in digits 2-5 next session. Pt reports dull pain with L wrist PROM. Instructed pt to add L MP, PIP, wrist AROM flexion " within the constraint of splint and scar desensitization to HEP. Pt demonstrated good return demonstration of added exercises to HEP. Patient continues to demonstrate limitation  with  ROM, Joint mobility, Stiffness, Decreased fine motor coordination, Decreased functional use, Decreased strength and Continued pain all of which interfere with pt's vocational and leisurely pursuits.       Goals: ( 8 weeks)   1)   Patient to be IND with HEP and modalities for pain management  2)   Assess ROM in 1-2 weeks.  3)   Assess  strength in 1-2 weeks.  4)   Assess pinch strength in 1-2 weeks.  5)   Decrease edema .2-.3 cm to increase joint mobility /flexibility for functional hand use.   6)   Patient to score at 62% or more on FOTO to demonstrate improved perception of functional hand use.  7)   Decrease complaints of pain to  2 out of 10 to increase functional hand use for ADL/work/leisure activities.  8)   Patient to be IND wiht Orthotic use, wear and care precautions.       Plan:  Continue 2x week x 8 weeks  during the certification period from   5/19/17 to 7/14/17  in pursuit of  OT goals.

## 2017-05-24 ENCOUNTER — CLINICAL SUPPORT (OUTPATIENT)
Dept: REHABILITATION | Facility: HOSPITAL | Age: 57
End: 2017-05-24
Attending: ORTHOPAEDIC SURGERY
Payer: MEDICAID

## 2017-05-24 DIAGNOSIS — M25.649 DECREASED ROM OF FINGER: ICD-10-CM

## 2017-05-24 DIAGNOSIS — M79.642 HAND PAIN, LEFT: ICD-10-CM

## 2017-05-24 PROCEDURE — 97530 THERAPEUTIC ACTIVITIES: CPT | Mod: PO

## 2017-05-24 NOTE — PROGRESS NOTES
"OT Daily Progress Note    Patient:  Claude R Morris Jr.  Clinic #:  [id number]   Date of Note: 05/24/2017   Referring Physician:  Bernie Cook III, *  Diagnosis:  S/p L zone V flexor tendon repair  Encounter Diagnoses   Name Primary?    Decreased ROM of finger     Hand pain, left         Start Time: 11  End Time: 12  Total Time: 60 min  Group Time: -    Visit 3 of 16 authorized  MEDICARE/DASH visit #3    Subjective:   "Its doing okay, I want to change the velcro though so its not touching my hairs."  Pain: 5 out of 10     Objective:   Patient seen by OT this session. Treatment  consist of the following:  manual therapy/joint mobilization and Therapeutic exercise    Patient received paraffin bath to R hand(s) while in safe position x 10 minutes to increase blood flow, circulation, pain management and for tissue elasticity prior to therex. Performed manual therapy techniques x 30 min for passive protective ROM for DIP/PIP extension and composite flexion on L digits 2-5; L wrist PROM. Pt was instructed on and performed active PIP and DIP flexion in safe position x 15 min. Adjusted orthotic strapping to provide relief due to hypersensitivity. Reviewed HEP (addition of AROM within constrains of dorsal blocking splint) and modality use for pain management with patient reporting good understanding of same.     Range of Motion:     left Active   (Ext/Flex) Index Middle Ring Small   MP NT/67 NT/63 NT/45 NT/64   PIP NT/50 NT/65 NT/53 NT/10   DIP NT/50 NT/35 NT/17 NT/65       Wrist ROM  Flexion 43       Treatment: Manual therapy x 30 min, Therex x 15 min, Para x 10 min      Assessment:  Pt will continue to benefit from skilled OT intervention. Pt tolerated all treatment well but continues with hypersensitivity near scarred area, once again encouraged desensitization at home. Pt will good passive motion in all digits and improving active motion with each session. Initial measures taken today. Patient continues to " demonstrate limitation  with  ROM, Joint mobility, Stiffness, Decreased fine motor coordination, Decreased functional use, Decreased strength and Continued pain all of which interfere with pt's vocational and leisurely pursuits.       Goals: ( 8 weeks)   1)   Patient to be IND with HEP and modalities for pain management  2)   Assess ROM in 1-2 weeks.  3)   Assess  strength in 1-2 weeks.  4)   Assess pinch strength in 1-2 weeks.  5)   Decrease edema .2-.3 cm to increase joint mobility /flexibility for functional hand use.   6)   Patient to score at 62% or more on FOTO to demonstrate improved perception of functional hand use.  7)   Decrease complaints of pain to  2 out of 10 to increase functional hand use for ADL/work/leisure activities.  8)   Patient to be IND wiht Orthotic use, wear and care precautions.       Plan:  Continue 2x week x 8 weeks  during the certification period from   5/19/17 to 7/14/17  in pursuit of  OT goals.

## 2017-05-29 ENCOUNTER — CLINICAL SUPPORT (OUTPATIENT)
Dept: REHABILITATION | Facility: HOSPITAL | Age: 57
End: 2017-05-29
Attending: ORTHOPAEDIC SURGERY
Payer: MEDICAID

## 2017-05-29 DIAGNOSIS — M79.642 HAND PAIN, LEFT: ICD-10-CM

## 2017-05-29 DIAGNOSIS — M25.649 DECREASED ROM OF FINGER: ICD-10-CM

## 2017-05-29 PROCEDURE — 97530 THERAPEUTIC ACTIVITIES: CPT | Mod: PO

## 2017-05-29 NOTE — PROGRESS NOTES
"OT Daily Progress Note    Patient:  Claude R Morris Jr.  Clinic #:  [id number]   Date of Note: 05/29/2017   Referring Physician:  Bernie Cook III, *  Diagnosis:  S/p L zone V flexor tendon repair  Encounter Diagnoses   Name Primary?    Decreased ROM of finger     Hand pain, left         Start Time: 9  End Time: 10  Total Time: 60 min  Group Time: -    Visit 4 of 16 authorized  MEDICARE/DASH visit #4    Subjective:   "I took my splint off to go swimming, and I used my arm to move a pool."  Pain: 5 out of 10     Objective:   Patient seen by OT this session. Treatment  consist of the following:  manual therapy/joint mobilization and Therapeutic exercise    Patient received paraffin bath to R hand(s) while in safe position x 10 minutes to increase blood flow, circulation, pain management and for tissue elasticity prior to therex. Patient received ultrasound to  L wrist area to increase blood flow, circulation, tissue elasticity, pain management and for wound/scar management for 8 minutes @ 3.3 Mhz, Intensity 0.6 w/cm2 at 50* duty cycle. Performed scar massage to L wrist area for 5 minutes to decrease adhesions and improve tensile glide. Performed manual therapy techniques x 15 min for passive protective ROM for DIP/PIP extension and composite flexion on L digits 2-5; L wrist PROM. Pt was instructed on and performed active PIP and DIP flexion in safe position x 15 min. Adjusted orthotic strapping to provide relief due to hypersensitivity. Reviewed HEP (addition of AROM within constrains of dorsal blocking splint) and modality use for pain management with patient reporting good understanding of same.     Range of Motion:5/24/17     left Active   (Ext/Flex) Index Middle Ring Small   MP NT/67 NT/63 NT/45 NT/64   PIP NT/50 NT/65 NT/53 NT/10   DIP NT/50 NT/35 NT/17 NT/65       Wrist ROM  Flexion 43       Treatment: Paraffin x 10 min, Ultrasound x 8 min, Manual therapy x 15 min, Therex x 15 min,      Assessment:  Pt will " continue to benefit from skilled OT intervention. Pt reports compliance with ROM of fingers within constraints of splint. Pt tolerated all treatment well but continues with hypersensitivity and tingling pain near scarred area during scar massage, especially near proximal end. Pt will good passive motion in all digits and improving active motion with each session. Plan to add active blocking DIP/PIP exercises next session. Pt reports no pain with manual PROM with therapist, but says he has pain with AROM by himself. Patient continues to demonstrate limitation  with  ROM, Joint mobility, Stiffness, Decreased fine motor coordination, Decreased functional use, Decreased strength and Continued pain all of which interfere with pt's vocational and leisurely pursuits.       Goals: ( 8 weeks)   1)   Patient to be IND with HEP and modalities for pain management  2)   Assess ROM in 1-2 weeks.  3)   Assess  strength in 1-2 weeks.  4)   Assess pinch strength in 1-2 weeks.  5)   Decrease edema .2-.3 cm to increase joint mobility /flexibility for functional hand use.   6)   Patient to score at 62% or more on FOTO to demonstrate improved perception of functional hand use.  7)   Decrease complaints of pain to  2 out of 10 to increase functional hand use for ADL/work/leisure activities.  8)   Patient to be IND wiht Orthotic use, wear and care precautions.       Plan:  Continue 2x week x 8 weeks  during the certification period from   5/19/17 to 7/14/17  in pursuit of  OT goals.

## 2017-05-31 ENCOUNTER — CLINICAL SUPPORT (OUTPATIENT)
Dept: REHABILITATION | Facility: HOSPITAL | Age: 57
End: 2017-05-31
Attending: ORTHOPAEDIC SURGERY
Payer: MEDICAID

## 2017-05-31 DIAGNOSIS — M79.642 HAND PAIN, LEFT: ICD-10-CM

## 2017-05-31 DIAGNOSIS — M25.649 DECREASED ROM OF FINGER: ICD-10-CM

## 2017-05-31 PROCEDURE — 97530 THERAPEUTIC ACTIVITIES: CPT | Mod: PO

## 2017-05-31 NOTE — PROGRESS NOTES
"OT Daily Progress Note    Patient:  Claude R Morris Jr.  Clinic #:  [id number]   Date of Note: 05/31/2017   Referring Physician:  Bernie Cook III, *  Diagnosis:  S/p L zone V flexor tendon repair  Encounter Diagnoses   Name Primary?    Decreased ROM of finger     Hand pain, left         Start Time: 9  End Time: 10  Total Time: 60 min  Group Time: -    Visit 5 of 16 authorized  MEDICARE/DASH visit #5    Subjective:   "I don't wear the splint all day."  Pain: 5 out of 10     Objective:   Patient seen by OT this session. Treatment  consist of the following:  manual therapy/joint mobilization and Therapeutic exercise    Patient received paraffin bath to L hand while in safe position x 10 minutes to increase blood flow, circulation, pain management and for tissue elasticity prior to therex. Patient received ultrasound to  L wrist area to increase blood flow, circulation, tissue elasticity, pain management and for wound/scar management for 8 minutes @ 3.3 Mhz, Intensity 0.6 w/cm2 at 50* duty cycle. Performed scar massage/RM to L wrist area for 15 minutes to decrease adhesions and improve tensile glide. Performed manual therapy techniques x 15 min for passive protective ROM for DIP/PIP extension and composite flexion on L digits 2-5; L wrist PROM. Reviewed HEP and modality use for pain management with patient reporting good understanding of same.     Treatment: Paraffin x 10 min, Ultrasound x 8 min, Manual therapy x 30 min     Assessment:  Pt will continue to benefit from skilled OT intervention. Pt reports that he taking his splint off for longer and more frequent time periods, but that he is being cautious as to keep his fingers in a safe position. Pt tolerated all treatment well but continues with a pulling sensation in scar with some hand movements. Pt will good passive motion in all digits and improving active motion with each session. Plan to add active blocking DIP/PIP exercises next session. Patient continues " to demonstrate limitation  with  ROM, Joint mobility, Stiffness, Decreased fine motor coordination, Decreased functional use, Decreased strength and Continued pain all of which interfere with pt's vocational and leisurely pursuits.       Goals: ( 8 weeks)   1)   Patient to be IND with HEP and modalities for pain management  2)   Assess ROM in 1-2 weeks.  3)   Assess  strength in 1-2 weeks.  4)   Assess pinch strength in 1-2 weeks.  5)   Decrease edema .2-.3 cm to increase joint mobility /flexibility for functional hand use.   6)   Patient to score at 62% or more on FOTO to demonstrate improved perception of functional hand use.  7)   Decrease complaints of pain to  2 out of 10 to increase functional hand use for ADL/work/leisure activities.  8)   Patient to be IND wiht Orthotic use, wear and care precautions.       Plan:  Continue 2x week x 8 weeks  during the certification period from   5/19/17 to 7/14/17  in pursuit of  OT goals.

## 2017-06-05 ENCOUNTER — CLINICAL SUPPORT (OUTPATIENT)
Dept: REHABILITATION | Facility: HOSPITAL | Age: 57
End: 2017-06-05
Attending: ORTHOPAEDIC SURGERY
Payer: MEDICAID

## 2017-06-05 DIAGNOSIS — M25.649 DECREASED ROM OF FINGER: ICD-10-CM

## 2017-06-05 DIAGNOSIS — M79.642 HAND PAIN, LEFT: ICD-10-CM

## 2017-06-05 PROCEDURE — 97140 MANUAL THERAPY 1/> REGIONS: CPT | Mod: PO

## 2017-06-05 PROCEDURE — 97110 THERAPEUTIC EXERCISES: CPT | Mod: PO

## 2017-06-05 PROCEDURE — 97018 PARAFFIN BATH THERAPY: CPT | Mod: PO

## 2017-06-05 NOTE — PROGRESS NOTES
"OT Daily Progress Note    Patient:  Claude R Morris Jr.  Clinic #:  [id number]   Date of Note: 06/05/2017   Referring Physician:  Bernie Cook III, *  Diagnosis:  S/p L zone V flexor tendon repair  Encounter Diagnoses   Name Primary?    Decreased ROM of finger     Hand pain, left         Start Time:11  End Time: 12  Total Time: 60 min  Group Time:     Visit 7 of 16 authorized  MEDICARE/DASH visit #7    Subjective:   "I don't wear the splint all day."  Pain: 5 out of 10     Objective:   Patient seen by OT this session. Treatment  consist of the following:  manual therapy/joint mobilization and Therapeutic exercise    Patient received paraffin bath to L hand while in safe position x 10 minutes to increase blood flow, circulation, pain management and for tissue elasticity prior to therex.  Performed scar massage/RM to L wrist area for 15 minutes to decrease adhesions and improve tensile glide. Performed manual therapy techniques x 25 min for passive protective ROM for DIP/PIP extension and composite flexion on L digits 2-5; L wrist PROM, blocking exercises and place and hold. Reviewed HEP and modality use for pain management with patient reporting good understanding of same.     Range of Motion:      left Active   (Flex) Index Middle Ring Small   MP 70 (+3) 75 (+12) 65 (+20) 65 (+1)   PIP 80 (+30) 84 (+19) 80 (+27) 75 (+65)   DIP 55 (+5) 55 (+20) 55 (+38) 65 (+0)         Wrist ROM  Flexion 65 (+22)         Treatment: Paraffin x 10 min, Ultrasound x 8 min, Manual therapy x 30 min     Assessment:  Pt will continue to benefit from skilled OT intervention. Pt reports that he taking his splint off for longer and more frequent time periods, but that he is being cautious as to keep his fingers in a safe position. Pt will good passive motion in all digits and improving active motion with each session. Tolerated place and hold well. Per R/A today he has significant gains in motion and is nearing week 6 natan and orthotic to " be d/c soon. Pt progressing well. Patient continues to demonstrate limitation  with  ROM, Joint mobility, Stiffness, Decreased fine motor coordination, Decreased functional use, Decreased strength and Continued pain all of which interfere with pt's vocational and leisurely pursuits.       Goals: ( 8 weeks)   1)   Patient to be IND with HEP and modalities for pain management----met  2)   Assess ROM in 1-2 weeks.----met  3)   Assess  strength in 1-2 weeks.  4)   Assess pinch strength in 1-2 weeks.  5)   Decrease edema .2-.3 cm to increase joint mobility /flexibility for functional hand use.   6)   Patient to score at 62% or more on FOTO to demonstrate improved perception of functional hand use.  7)   Decrease complaints of pain to  2 out of 10 to increase functional hand use for ADL/work/leisure activities.----met  8)   Patient to be IND wiht Orthotic use, wear and care precautions. ----met      Plan:  Continue 2x week x 8 weeks  during the certification period from   5/19/17 to 7/14/17  in pursuit of  OT goals.

## 2017-06-08 ENCOUNTER — CLINICAL SUPPORT (OUTPATIENT)
Dept: REHABILITATION | Facility: HOSPITAL | Age: 57
End: 2017-06-08
Attending: ORTHOPAEDIC SURGERY
Payer: MEDICAID

## 2017-06-08 DIAGNOSIS — M79.642 HAND PAIN, LEFT: ICD-10-CM

## 2017-06-08 DIAGNOSIS — M25.649 DECREASED ROM OF FINGER: ICD-10-CM

## 2017-06-08 PROCEDURE — 97530 THERAPEUTIC ACTIVITIES: CPT | Mod: PO

## 2017-06-08 NOTE — PROGRESS NOTES
"OT Daily Progress Note    Patient:  Claude R Morris Jr.  Clinic #:  [id number]   Date of Note: 06/08/2017   Referring Physician:  Bernie Cook III, *  Diagnosis:  S/p L zone V flexor tendon repair  Encounter Diagnoses   Name Primary?    Decreased ROM of finger     Hand pain, left         Start Time: 8  End Time: 9  Total Time: 60 min  Group Time: -    Visit 8 of 16 authorized  MEDICARE/DASH visit #8    Subjective:   "I forgot my splint today. The doctor told me to wean out of it"  Pain: 3 out of 10     Objective:   Patient seen by OT this session. Treatment  consist of the following:  manual therapy/joint mobilization and Therapeutic exercise    Patient received paraffin bath to L hand while in safe position x 10 minutes to increase blood flow, circulation, pain management and for tissue elasticity prior to therex. Patient received ultrasound to volar wrist scar area to increase blood flow, circulation, tissue elasticity, pain management and for wound/scar management for 8 minutes @ 3.3 Mhz, Intensity 0.8 w/cm2 at 100* duty cycle.  Performed scar massage/RM to L wrist area for 5 minutes to decrease adhesions and improve tensile glide. Performed manual therapy techniques x 20 min for PROM of MP/PIP/DIP and composite flexion place and hold of digits with wrist in neutral. Reviewed HEP (with addition of tendon glides, AROM wrist exercises, FDP/FDS blocking, and finger abduction/adduction) and modality use for pain management with patient reporting good understanding of same.     Treatment: Paraffin x 10 min, Ultrasound x 8 min, Manual therapy x 30 min     Reassessment 6/5/17    Assessment:  Pt will continue to benefit from skilled OT intervention. Pt arrived today not wearing splint. Pt reports MD is satisfied with healing and wants him to begin weaning out of splint. MD ordered to begin AROM of wrist and hand. Composite fist place and hold is improving. Added tendon glides, AROM wrist exercises, FDP/FDS blocking, " and finger abduction/adduction to HEP with pt demonstrating good understanding of all added exercises. Abduction added due to slight scissoring of index finger. Patient continues to demonstrate limitation  with  ROM, Joint mobility, Stiffness, Decreased fine motor coordination, Decreased functional use, Decreased strength and Continued pain all of which interfere with pt's vocational and leisurely pursuits.       Goals: ( 8 weeks)   1)   Patient to be IND with HEP and modalities for pain management----met  2)   Assess ROM in 1-2 weeks.----met  3)   Assess  strength in 1-2 weeks.  4)   Assess pinch strength in 1-2 weeks.  5)   Decrease edema .2-.3 cm to increase joint mobility /flexibility for functional hand use.   6)   Patient to score at 62% or more on FOTO to demonstrate improved perception of functional hand use.  7)   Decrease complaints of pain to  2 out of 10 to increase functional hand use for ADL/work/leisure activities.----met  8)   Patient to be IND wiht Orthotic use, wear and care precautions. ----met      Plan:  Continue 2x week x 8 weeks  during the certification period from   5/19/17 to 7/14/17  in pursuit of  OT goals.

## 2017-06-12 ENCOUNTER — CLINICAL SUPPORT (OUTPATIENT)
Dept: REHABILITATION | Facility: HOSPITAL | Age: 57
End: 2017-06-12
Attending: ORTHOPAEDIC SURGERY
Payer: MEDICAID

## 2017-06-12 DIAGNOSIS — M79.642 HAND PAIN, LEFT: ICD-10-CM

## 2017-06-12 DIAGNOSIS — M25.649 DECREASED ROM OF FINGER: ICD-10-CM

## 2017-06-12 PROCEDURE — 97530 THERAPEUTIC ACTIVITIES: CPT | Mod: PO

## 2017-06-12 NOTE — PROGRESS NOTES
"OT Daily Progress Note    Patient:  Claude R Morris Jr.  Clinic #:  [id number]   Date of Note: 06/12/2017   Referring Physician:  Bernie Cook III, *  Diagnosis:  S/p L zone V flexor tendon repair  Encounter Diagnoses   Name Primary?    Decreased ROM of finger     Hand pain, left         Start Time: 11  End Time: 12  Total Time: 60 min  Group Time: 20 min    Visit 9 of 16 authorized  MEDICARE/DASH visit #9     Subjective:   "I had some throbbing over the weekend"  Pain: 3 out of 10     Objective:   Patient seen by OT this session. Treatment  consist of the following:  manual therapy/joint mobilization and Therapeutic exercise    Patient received paraffin bath to L hand x 10 minutes to increase blood flow, circulation, pain management and for tissue elasticity prior to therex. Patient received ultrasound to volar wrist scar area to increase blood flow, circulation, tissue elasticity, pain management and for wound/scar management for 8 minutes @ 3.3 Mhz, Intensity 0.8 w/cm2 at 100* duty cycle. Patient received fluidotherapy to L hand for 15 minutes to increase blood flow, circulation, desensitization, sensory re-education and for pain management. Pt instructed on and performed the following exercises while in fluido: tendon glides x 3 min, wrist AROM flexion/extension, RD/UD, supination/pronation, finger abduction/adduction, and composite fist x 2 min each. Performed scar massage/RM to L wrist area for 5 minutes to decrease adhesions and improve tensile glide. Performed manual therapy techniques x 10 min for PROM of MP/PIP/DIP and composite flexion place and hold of digits with wrist in neutral. Reviewed HEP and modality use for pain management with patient reporting good understanding of same.     Treatment: Paraffin x 10 min, Ultrasound x 8 min, Manual therapy x 15 min, therex in fluido x 15 min     Reassessment 6/5/17    Assessment:  Pt will continue to benefit from skilled OT intervention. Pt arrived today " "wearing splint after report of increased pain in arm last night. Pt tolerated upgraded AROM in fluido well this session. Pt improving with place and hold composite fist each session. Patient continues to demonstrate limitation  with  ROM, Joint mobility, Stiffness, Decreased fine motor coordination, Decreased functional use, Decreased strength and Continued pain all of which interfere with pt's vocational and leisurely pursuits.       Goals: ( 8 weeks)   1)   Patient to be IND with HEP and modalities for pain management----met  2)   Assess ROM in 1-2 weeks.----met  3)   Assess  strength in 1-2 weeks.  4)   Assess pinch strength in 1-2 weeks.  5)   Decrease edema .2-.3 cm to increase joint mobility /flexibility for functional hand use.   6)   Patient to score at 62% or more on FOTO to demonstrate improved perception of functional hand use.  7)   Decrease complaints of pain to  2 out of 10 to increase functional hand use for ADL/work/leisure activities.----met  8)   Patient to be IND with Orthotic use, wear and care precautions. ----met      Plan:  Continue 2x week x 8 weeks  during the certification period from   5/19/17 to 7/14/17  in pursuit of  OT goals.        Student: PHI Ortega    " I certify that I was present in the room directing the student in service delivery and guiding them using my skilled judgement. As the co-signing therapist, I have reviewed the student's documentation and am responsible for the treatment, assessment and plan."    Therapist: EARLENE Diez CLT      "

## 2017-06-15 ENCOUNTER — CLINICAL SUPPORT (OUTPATIENT)
Dept: REHABILITATION | Facility: HOSPITAL | Age: 57
End: 2017-06-15
Attending: ORTHOPAEDIC SURGERY
Payer: MEDICAID

## 2017-06-15 DIAGNOSIS — M79.642 HAND PAIN, LEFT: ICD-10-CM

## 2017-06-15 DIAGNOSIS — M25.649 DECREASED ROM OF FINGER: ICD-10-CM

## 2017-06-15 PROCEDURE — 97530 THERAPEUTIC ACTIVITIES: CPT | Mod: PO

## 2017-06-21 ENCOUNTER — CLINICAL SUPPORT (OUTPATIENT)
Dept: REHABILITATION | Facility: HOSPITAL | Age: 57
End: 2017-06-21
Attending: ORTHOPAEDIC SURGERY
Payer: MEDICAID

## 2017-06-21 DIAGNOSIS — M79.642 HAND PAIN, LEFT: ICD-10-CM

## 2017-06-21 DIAGNOSIS — M25.649 DECREASED ROM OF FINGER: ICD-10-CM

## 2017-06-21 PROCEDURE — 97530 THERAPEUTIC ACTIVITIES: CPT | Mod: PO

## 2017-06-21 NOTE — PROGRESS NOTES
"OT Daily Progress Note    Patient:  Claude R Morris Jr.  Clinic #:  [id number]   Date of Note: 06/21/2017   Referring Physician:  Bernie Cook III, *  Diagnosis:  S/p L zone V flexor tendon repair  Encounter Diagnoses   Name Primary?    Decreased ROM of finger     Hand pain, left         Start Time: 10  End Time: 11  Total Time: 60 min  Group Time: -    Visit 11 of 16 authorized  MEDICARE/DASH visit #11     Subjective:   "It's feeling stiff today."  Pain: 3 out of 10     Objective:   Patient seen by OT this session. Treatment  consist of the following:  manual therapy/joint mobilization and Therapeutic exercise     Strength: (ANTOLIN Dynamometer in lbs.) Average 3 trials, Position II  Right: 100#  Left: 27#    Pinch Strength: (Pinch Gauge in psi's), Average 3 trials  Key Pinch  R) 18 psi's    L) 9 psi's  3pt Pinch    R) 13 psi's   L) 4 psi's (pain)  2pt Pinch    R) 11 psi's    L) 3 psi's    Patient received paraffin bath to L hand x 10 minutes to increase blood flow, circulation, pain management and for tissue elasticity prior to therex. Patient received ultrasound to volar wrist scar area to increase blood flow, circulation, tissue elasticity, pain management and for wound/scar management for 8 minutes @ 3.3 Mhz, Intensity 0.8 w/cm2 at 100* duty cycle. Performed scar massage/RM to L wrist area for 5 minutes to decrease adhesions and improve tensile glide. Pt performed therapist assisted place and hold for composite fist x 5 reps. Pt performed highlighter rolls x 10 reps. Patient received fluidotherapy to L hand for 13 minutes to increase blood flow, circulation, desensitization, sensory re-education and for pain management. Pt instructed on and performed the following exercises while in fluido: tendon glides x 3 min, wrist AROM flexion/extension, RD/UD, supination/pronation, finger marches, and composite fist x 2 min each. Patient performed red digiflex for composite finger flexion x 15 reps each for L hand " strengthening. Patient performed red clothespin for key, 3pt and 2pt pinch x 10 reps for L pinch strengthening. Patient performed 25# PHG for sustained gripping,moving large pegs from pegboard to basket without compensation via supination x 2 trials, for  strengthening. Reviewed HEP (performed yellow putty raking and reverse raking x 5 reps each, and added claw fist squeeze) and modality use for pain management with patient reporting good understanding of same.     Treatment: Paraffin x 10 min, Ultrasound x 8 min, Manual therapy x 10 min, therex x 30 min     Reassessment 6/5/17    Assessment:  Pt will continue to benefit from skilled OT intervention. Pt reports compliance with yellow putty HEP. Assessed  and pinch strength today and updated pt's goals accordingly. Pt composite fist place and hold improving with holds for longer time period. Added  and pinch exercises this session. Pt tolerated upgrades well with no adverse effects. Added claw fist squeeze to putty program. Patient continues to demonstrate limitation  with  ROM, Joint mobility, Stiffness, Decreased fine motor coordination, Decreased functional use, Decreased strength and Continued pain all of which interfere with pt's vocational and leisurely pursuits.       Goals: ( 8 weeks)   1)   Patient to be IND with HEP and modalities for pain management----met  2)   Assess ROM in 1-2 weeks.----met  3)   Assess  strength in 1-2 weeks.-----met  4)   Assess pinch strength in 1-2 weeks.-----met  5)   Decrease edema .2-.3 cm to increase joint mobility /flexibility for functional hand use.   6)   Patient to score at 62% or more on FOTO to demonstrate improved perception of functional hand use.  7)   Decrease complaints of pain to  2 out of 10 to increase functional hand use for ADL/work/leisure activities.----met  8)   Patient to be IND with Orthotic use, wear and care precautions. ----met  9)   Pt will increase L  strength by 30# in order to use  "tools.  10) Pt will increase L pinch strength 2-3 psi's for buttons and fasteners.  11) Assess wrist extension AROM.  12) Pt will improve hand AROM to full composite fist.      Plan:  Continue 2x week x 8 weeks  during the certification period from   5/19/17 to 7/14/17  in pursuit of  OT goals.          Student: PHI Ortega    " I certify that I was present in the room directing the student in service delivery and guiding them using my skilled judgement. As the co-signing therapist, I have reviewed the student's documentation and am responsible for the treatment, assessment and plan."    Therapist:    "

## 2017-06-23 ENCOUNTER — CLINICAL SUPPORT (OUTPATIENT)
Dept: REHABILITATION | Facility: HOSPITAL | Age: 57
End: 2017-06-23
Attending: ORTHOPAEDIC SURGERY
Payer: MEDICAID

## 2017-06-23 DIAGNOSIS — M25.649 DECREASED ROM OF FINGER: ICD-10-CM

## 2017-06-23 DIAGNOSIS — M79.642 HAND PAIN, LEFT: ICD-10-CM

## 2017-06-23 PROCEDURE — 97530 THERAPEUTIC ACTIVITIES: CPT | Mod: PO

## 2017-06-23 NOTE — PATIENT INSTRUCTIONS
Wrist Extensor Stretch        Keeping elbow straight, grasp left hand and slowly bend wrist forward until stretch is felt. Hold 10 seconds. Relax.  Repeat 10 times per set.         Wrist Flexor Stretch        Keeping elbow straight, grasp left hand and slowly bend wrist back until stretch is felt. Hold 10 seconds. Relax.  Repeat 10 times per set.

## 2017-06-23 NOTE — PROGRESS NOTES
"OT Daily Progress Note    Patient:  Claude R Morris Jr.  Clinic #:  [id number]   Date of Note: 06/23/2017   Referring Physician:  Bernie Cook III, *  Diagnosis:  S/p L zone V flexor tendon repair  Encounter Diagnoses   Name Primary?    Decreased ROM of finger     Hand pain, left         Start Time: 10  End Time: 11  Total Time: 60 min  Group Time: -    Visit 11 of 16 authorized  MEDICARE/DASH visit #11     Subjective:   "It's feeling stiff today."  Pain: 3 out of 10     Objective:   Patient seen by OT this session. Treatment  consist of the following:  manual therapy/joint mobilization and Therapeutic exercise    Patient received paraffin bath to L hand x 10 minutes to increase blood flow, circulation, pain management and for tissue elasticity prior to therex. Patient received ultrasound to volar wrist scar area to increase blood flow, circulation, tissue elasticity, pain management and for wound/scar management for 8 minutes @ 3.3 Mhz, Intensity 0.8 w/cm2 at 100* duty cycle. Performed scar massage/RM to L wrist area for 5 minutes to decrease adhesions and improve tensile glide. Pt performed therapist assisted place and hold for composite fist x 5 reps with 10-15 second holds. Pt performed highlighter rolls x 20 reps. Performed STM x 5 min. Patient received fluidotherapy to L hand for 12 minutes to increase blood flow, circulation, desensitization, sensory re-education and for pain management. Pt instructed on and performed the following exercises while in fluido: tendon glide hook fist, highlighter roll into wrist extension, wrist AROM flexion/extension with 1# weight, RD/UD with 1# weight, supination/pronation with 1# weight, and composite fist x 2 min each. Patient performed red digiflex for composite finger flexion x 15 reps each for L hand strengthening. Patient performed red clothespin for key, 3pt and 2pt pinch x 15 reps for L pinch strengthening. Patient performed 35# PHG for sustained gripping,moving " large pegs from pegboard to basket without compensation via supination x 2 trials, for  strengthening. Reviewed HEP (with addition of wrist extensor and flexor stretch x 5 reps each) and modality use for pain management with patient reporting good understanding of same.     Treatment: Paraffin x 10 min, Ultrasound x 8 min, Manual therapy x 15 min, therex x 25 min     Reassessment 6/5/17 (/pinch 6/21)    Assessment:  Pt will continue to benefit from skilled OT intervention. Pt reports compliance with yellow putty HEP. Pt composite fist place and hold improving with holds for longer time period. Pt performing better with highlighter rolls this session. Upgraded  and pinch exercises this session. Added weight to wrist AROM in fluido today. Pt tolerated upgrades well with no adverse effects. Patient continues to demonstrate limitation  with  ROM, Joint mobility, Stiffness, Decreased fine motor coordination, Decreased functional use, Decreased strength and Continued pain all of which interfere with pt's vocational and leisurely pursuits.       Goals: ( 8 weeks)   1)   Patient to be IND with HEP and modalities for pain management----met  2)   Assess ROM in 1-2 weeks.----met  3)   Assess  strength in 1-2 weeks.-----met  4)   Assess pinch strength in 1-2 weeks.-----met  5)   Decrease edema .2-.3 cm to increase joint mobility /flexibility for functional hand use.   6)   Patient to score at 62% or more on FOTO to demonstrate improved perception of functional hand use.  7)   Decrease complaints of pain to  2 out of 10 to increase functional hand use for ADL/work/leisure activities.----met  8)   Patient to be IND with Orthotic use, wear and care precautions. ----met  9)   Pt will increase L  strength by 30# in order to use tools.  10) Pt will increase L pinch strength 2-3 psi's for buttons and fasteners.  11) Assess wrist extension AROM.  12) Pt will improve hand AROM to full composite  fist.      Plan:  Continue 2x week x 8 weeks  during the certification period from   5/19/17 to 7/14/17  in pursuit of  OT goals.

## 2017-06-29 NOTE — PROGRESS NOTES
"OT Daily Progress Note    Patient:  Claude R Morris Jr.  Clinic #:  [id number]   Date of Note: 06/29/2017   Referring Physician:  Bernie Cook III, *  Diagnosis:  S/p L zone V flexor tendon repair  Encounter Diagnoses   Name Primary?    Decreased ROM of finger     Hand pain, left         Start Time: 9  End Time: 10  Total Time: 60 min  Group Time: 20    Visit 11 of 16 authorized  MEDICARE/DASH visit #11     Subjective:   "It's feeling stiff today."  Pain: 3 out of 10     Objective:   Patient seen by OT this session. Treatment  consist of the following:  manual therapy/joint mobilization and Therapeutic exercise    Reassessment as follows:    Range of Motion:        (Flex) Index Middle Ring Small   MP -5/75 -10/80 -10/75 0/75   PIP 0/85 0/95 0/95 0/80   DIP -35/45 0/50 0/50 -40/65       Wrist ROM (left)  Flexion 65     Extension 50   RD 15   UD 30   Supination 80   Pronation 90      Strength: (ANTOLIN Dynamometer in lbs.) Average 3 trials, Position II  Right: 100#  Left: 37# (+10)     Pinch Strength: (Pinch Gauge in psi's), Average 3 trials  Acosta Pinch                  R) 18 psi's                 L) 8 psi's (-1)  3pt Pinch                   R) 13 psi's                 L) 8 psi's (+4)  2pt Pinch                   R) 11 psi's                 L) 5.5 psi's (+2.5)    Patient received paraffin bath to L hand x 10 minutes to increase blood flow, circulation, pain management and for tissue elasticity prior to therex. Pt performed therapist assisted place and hold for composite fist x 5 reps with 10-15 second holds followed by intrinsic stretch x 5 min. Instructed on and performed juxtaciser for improved wrist mobility x 2 min. Performed 2# free weight for wrist flexion/extension (2 positions), RD/UD, supination/pronation x 15 reps each for wrist strengthening.  Pt performed highlighter rolls x 15 reps.  Patient performed red digiflex for isolated and green for composite finger flexion x 15 reps each for L hand " strengthening. Patient performed green clothespin for key, 3pt and 2pt pinch x 15 reps for L pinch strengthening. Patient performed 55# PHG for sustained gripping,moving large pegs from pegboard to basket without compensation via supination x 3 trials, for  strengthening. Instructed on and performed red ger bar for  strengthening and wrist strengthening x 10 reps flex/ext and x 10 reps sup/pro. Reviewed HEP (with addition of wrist extensor and flexor stretch x 5 reps each) and modality use for pain management with patient reporting good understanding of same.     Treatment: Paraffin x 10 min, Manual therapy x 15 min, therex x 30 min    Assessment:  Pt will continue to benefit from skilled OT intervention. Upon reassessment, pt shows improvement in finger AROM/composite fist and /pinch strength. He has met 8/12 goals. Added one new goal for wrist extension. Pt performed well with all upgraded exercises today. Patient continues to demonstrate limitation  with  ROM, Joint mobility, Stiffness, Decreased fine motor coordination, Decreased functional use, Decreased strength and Continued pain all of which interfere with pt's vocational and leisurely pursuits.       Goals: ( 8 weeks)   1)   Patient to be IND with HEP and modalities for pain management----met  2)   Assess ROM in 1-2 weeks.----met  3)   Assess  strength in 1-2 weeks.-----met  4)   Assess pinch strength in 1-2 weeks.-----met  5)   Decrease edema .2-.3 cm to increase joint mobility /flexibility for functional hand use. -----met  6)   Patient to score at 62% or more on FOTO to demonstrate improved perception of functional hand use.  7)   Decrease complaints of pain to  2 out of 10 to increase functional hand use for ADL/work/leisure activities.----met  8)   Patient to be IND with Orthotic use, wear and care precautions. ----met  9)   Pt will increase L  strength by 30# in order to use tools.-----progressing  10) Pt will increase L pinch  strength 2-3 psi's for buttons and fasteners.-----progressing  11) Assess wrist extension AROM.-----met  12) Pt will improve hand AROM to full composite fist.-----progressing  13 Pt will increase wrist extension by 10 degrees.      Plan:  Continue 2x week x 8 weeks  during the certification period from   5/19/17 to 7/14/17  in pursuit of  OT goals.

## 2017-06-30 ENCOUNTER — CLINICAL SUPPORT (OUTPATIENT)
Dept: REHABILITATION | Facility: HOSPITAL | Age: 57
End: 2017-06-30
Attending: ORTHOPAEDIC SURGERY
Payer: MEDICAID

## 2017-06-30 DIAGNOSIS — M79.642 HAND PAIN, LEFT: ICD-10-CM

## 2017-06-30 DIAGNOSIS — M25.649 DECREASED ROM OF FINGER: ICD-10-CM

## 2017-06-30 PROCEDURE — 97150 GROUP THERAPEUTIC PROCEDURES: CPT | Mod: PO

## 2017-06-30 NOTE — PLAN OF CARE
Reassessment as follows 6/30/17:    Range of Motion:      (Flex) Index Middle Ring Small   MP -5/75 -10/80 -10/75 0/75   PIP 0/85 0/95 0/95 0/80   DIP -35/45 0/50 0/50 -40/65       Wrist ROM (left)  Flexion 65     Extension 50   RD 15   UD 30   Supination 80   Pronation 90      Strength: (ANTOLIN Dynamometer in lbs.) Average 3 trials, Position II  Right: 100#  Left: 37# (+10)     Pinch Strength: (Pinch Gauge in psi's), Average 3 trials  Acosta Pinch                  R) 18 psi's                 L) 8 psi's (-1)  3pt Pinch                   R) 13 psi's                 L) 8 psi's (+4)  2pt Pinch                   R) 11 psi's                 L) 5.5 psi's (+2.5)          Since beginning OT, pt has been seen 11 times since initial evaluation on 5/17/17. Overall, He has made good, steady progress with his OT treatments and has worked hard towards all of his OT goals as evidenced by subjective and objective improvements. Despite these improvements,He  remains with deficits with strengthening and will continue to benefit from skilled OT consisting of Paraffin, Fluidotherapy, Ultrasound, manual therapy/joint mobilization and Therapeutic exercises to address remaining limitations and increase functional mobility to return to PLOF with ADLs and IADLs.

## 2017-07-06 NOTE — PROGRESS NOTES
"OT Daily Progress Note    Patient:  Claude R Morris Jr.  Clinic #:  [id number]   Date of Note: 07/06/2017   Referring Physician:  Bernie Cook III, *  Diagnosis:  S/p L zone V flexor tendon repair  Encounter Diagnoses   Name Primary?    Decreased ROM of finger     Hand pain, left         Start Time: 11  End Time: 12  Total Time: 60 min  Group Time: 20    Visit 12 of 16 authorized    Subjective:   "It's feeling stiff today."  Pain: 3 out of 10     Objective:   Patient seen by OT this session. Treatment  consist of the following:  manual therapy/joint mobilization and Therapeutic exercise    Patient received paraffin bath to L hand x 10 minutes to increase blood flow, circulation, pain management and for tissue elasticity prior to therex. Patient received ultrasound to R volar wrist/forearm scar area to increase blood flow, circulation, tissue elasticity, pain management and for wound/scar management for 8 minutes @ 3.3 Mhz, Intensity 0.8 w/cm2 at 100* duty cycle. Performed scar massage to volar wrist/forearm area for 10 minutes to decrease adhesions and improve tensile glide.  Manual therapy for stretching of digits into PIP/DIP flexion and composite flexion, composite fist, lumbrical stretching, and stretching flexion/extension x 20 min. Pt performed therapist assisted place and hold for composite fist x 5 reps with 10-15 second holds. Reviewed HEP (with addition of wrist tband strenghtening) and modality use for pain management with patient reporting good understanding of same.     Treatment: Paraffin x 10 min, Ultrasound x 8 min, Manual therapy x 30 min, therex x 10 min    Reassessed 6/30/17    Assessment:  Pt returns to OT today after MD follow-up. Will focus therapy mostly on stretching for composite fist and scar management. Pt tolerated all treatment well today. Pt reports compliance with HEP, but still complains of "migdalia horse" feeling after a few reps of each exercise. Flexor tendons are still very " tight. Added orange tband wrist strengthening exercises to HEP so pt can work on strengthening at home. Patient continues to demonstrate limitation  with  ROM, Joint mobility, Stiffness, Decreased fine motor coordination, Decreased functional use, Decreased strength and Continued pain all of which interfere with pt's vocational and leisurely pursuits.       Goals: ( 8 weeks)   1)   Patient to be IND with HEP and modalities for pain management----met  2)   Assess ROM in 1-2 weeks.----met  3)   Assess  strength in 1-2 weeks.-----met  4)   Assess pinch strength in 1-2 weeks.-----met  5)   Decrease edema .2-.3 cm to increase joint mobility /flexibility for functional hand use. -----met  6)   Patient to score at 62% or more on FOTO to demonstrate improved perception of functional hand use.  7)   Decrease complaints of pain to  2 out of 10 to increase functional hand use for ADL/work/leisure activities.----met  8)   Patient to be IND with Orthotic use, wear and care precautions. ----met  9)   Pt will increase L  strength by 30# in order to use tools.-----progressing  10) Pt will increase L pinch strength 2-3 psi's for buttons and fasteners.-----progressing  11) Assess wrist extension AROM.-----met  12) Pt will improve hand AROM to full composite fist.-----progressing  13 Pt will increase wrist extension by 10 degrees.      Plan:  Continue 1x week x 4 weeks.

## 2017-07-07 ENCOUNTER — CLINICAL SUPPORT (OUTPATIENT)
Dept: REHABILITATION | Facility: HOSPITAL | Age: 57
End: 2017-07-07
Attending: ORTHOPAEDIC SURGERY
Payer: MEDICAID

## 2017-07-07 DIAGNOSIS — M79.642 HAND PAIN, LEFT: ICD-10-CM

## 2017-07-07 DIAGNOSIS — M25.649 DECREASED ROM OF FINGER: ICD-10-CM

## 2017-07-07 PROCEDURE — 97530 THERAPEUTIC ACTIVITIES: CPT | Mod: PO

## 2017-07-12 NOTE — PROGRESS NOTES
"OT Daily Progress Note    Patient:  Claude R Morris Jr.  Clinic #:  [id number]   Date of Note: 07/12/2017   Referring Physician:  Bernie Cook III, *  Diagnosis:  S/p L zone V flexor tendon repair  1. Decreased ROM of finger     2. Hand pain, left          Start Time: 8  End Time: 9  Total Time: 60 min  Group Time: 20    Visit 13 of 16 authorized    Subjective:   "It's feeling stiff today."  Pain: 3 out of 10     Objective:   Patient seen by OT this session. Treatment  consist of the following:  manual therapy/joint mobilization and Therapeutic exercise    Patient received paraffin bath to L hand x 10 minutes to increase blood flow, circulation, pain management and for tissue elasticity prior to therex. Patient received ultrasound to R volar wrist/forearm scar area to increase blood flow, circulation, tissue elasticity, pain management and for wound/scar management for 8 minutes @ 3.3 Mhz, Intensity 0.8 w/cm2 at 100* duty cycle. Performed scar massage to volar wrist/forearm area for 10 minutes to decrease adhesions and improve tensile glide.  Manual therapy for stretching of digits into PIP/DIP flexion and composite flexion, composite fist, lumbrical stretching, and stretching flexion/extension x 20 min. Pt performed highlighter rolls with wrist extension x 20 reps for tendon gliding and stretching. Reviewed HEP (with addition of wrist tband strenghtening) and modality use for pain management with patient reporting good understanding of same.     Treatment: Paraffin x 10 min, Ultrasound x 8 min, Manual therapy x 30 min, therex x 10 min    Reassessed 6/30/17    Assessment:  Pt tolerated all therapy well today. Scar tissue in forearm becoming less prominent. Will focus therapy mostly on stretching for composite fist and scar management. Pt reports compliance with HEP, but still complains of "migdalia horse" feeling after a few reps of each exercise. PROM stretching of wrist and fingers is improving. Patient continues " to demonstrate limitation  with  ROM, Joint mobility, Stiffness, Decreased fine motor coordination, Decreased functional use, Decreased strength and Continued pain all of which interfere with pt's vocational and leisurely pursuits.       Goals: ( 8 weeks)   1)   Patient to be IND with HEP and modalities for pain management----met  2)   Assess ROM in 1-2 weeks.----met  3)   Assess  strength in 1-2 weeks.-----met  4)   Assess pinch strength in 1-2 weeks.-----met  5)   Decrease edema .2-.3 cm to increase joint mobility /flexibility for functional hand use. -----met  6)   Patient to score at 62% or more on FOTO to demonstrate improved perception of functional hand use.  7)   Decrease complaints of pain to  2 out of 10 to increase functional hand use for ADL/work/leisure activities.----met  8)   Patient to be IND with Orthotic use, wear and care precautions. ----met  9)   Pt will increase L  strength by 30# in order to use tools.-----progressing  10) Pt will increase L pinch strength 2-3 psi's for buttons and fasteners.-----progressing  11) Assess wrist extension AROM.-----met  12) Pt will improve hand AROM to full composite fist.-----progressing  13 Pt will increase wrist extension by 10 degrees.      Plan:  Continue 1x week x 4 weeks.

## 2017-07-13 ENCOUNTER — CLINICAL SUPPORT (OUTPATIENT)
Dept: REHABILITATION | Facility: HOSPITAL | Age: 57
End: 2017-07-13
Attending: ORTHOPAEDIC SURGERY
Payer: MEDICAID

## 2017-07-13 DIAGNOSIS — M79.642 HAND PAIN, LEFT: ICD-10-CM

## 2017-07-13 DIAGNOSIS — M25.649 DECREASED ROM OF FINGER: ICD-10-CM

## 2017-07-13 PROCEDURE — 97530 THERAPEUTIC ACTIVITIES: CPT | Mod: PO

## 2017-07-19 NOTE — PROGRESS NOTES
"OT Daily Progress Note    Patient:  Claude R Morris Jr.  Clinic #:  [id number]   Date of Note: 07/19/2017   Referring Physician:  Bernie Cook III, *  Diagnosis:  S/p L zone V flexor tendon repair  1. Decreased ROM of finger     2. Hand pain, left          Start Time: 9  End Time: 10  Total Time: 60 min  Group Time: 20    Visit 14 of 16 authorized    Subjective:   "It's just stiff all the time. I've been using it a lot though."  Pain: 3 out of 10     Objective:   Patient seen by OT this session. Treatment  consist of the following:  manual therapy/joint mobilization and Therapeutic exercise    Patient received paraffin bath to L hand x 10 minutes to increase blood flow, circulation, pain management and for tissue elasticity prior to therex. Patient received ultrasound to R volar wrist/forearm scar area to increase blood flow, circulation, tissue elasticity, pain management and for wound/scar management for 8 minutes @ 3.3 Mhz, Intensity 0.8 w/cm2 at 100* duty cycle. Performed scar massage to volar wrist/forearm area for 10 minutes to decrease adhesions and improve tensile glide.  Manual therapy for stretching of digits into PIP/DIP flexion and composite flexion, composite fist, lumbrical stretching, and stretching wrist into flexion/extension x 10 min. Pt performed wrist juxticizer x 2 min for wrist ROM and strengthening. Pt instructed on and performed prayer stretch and reverse prayer stretch x 5 reps each with 10 second holds for wrist ROM and stretching. Pt performed supinator wheel x 2 min for wrist ROM and stretching. Pt performed highlighter rolls with wrist extension x 20 reps for tendon gliding and stretching. Reviewed HEP (with addition of wrist tband strenghtening) and modality use for pain management with patient reporting good understanding of same.     Treatment: Paraffin x 10 min, Ultrasound x 8 min, Manual therapy x 20 min, therex x 15 min    Reassessed 6/30/17    Assessment:  Pt tolerated all " "therapy well today. Pt still verbalizing persistent stiffness in forearm/hand. Scar tissue in forearm becoming less prominent. Added prayer stretch to HEP to stretch wrists into flexion and extension. Will focus therapy mostly on stretching for composite fist and scar management. Pt reports compliance with HEP, but still complains of "migdalia horse" feeling after a few reps of each exercise. PROM stretching of wrist and fingers is improving. Patient continues to demonstrate limitation  with  ROM, Joint mobility, Stiffness, Decreased fine motor coordination, Decreased functional use, Decreased strength and Continued pain all of which interfere with pt's vocational and leisurely pursuits.       Goals: ( 8 weeks)   1)   Patient to be IND with HEP and modalities for pain management----met  2)   Assess ROM in 1-2 weeks.----met  3)   Assess  strength in 1-2 weeks.-----met  4)   Assess pinch strength in 1-2 weeks.-----met  5)   Decrease edema .2-.3 cm to increase joint mobility /flexibility for functional hand use. -----met  6)   Patient to score at 62% or more on FOTO to demonstrate improved perception of functional hand use.  7)   Decrease complaints of pain to  2 out of 10 to increase functional hand use for ADL/work/leisure activities.----met  8)   Patient to be IND with Orthotic use, wear and care precautions. ----met  9)   Pt will increase L  strength by 30# in order to use tools.-----progressing  10) Pt will increase L pinch strength 2-3 psi's for buttons and fasteners.-----progressing  11) Assess wrist extension AROM.-----met  12) Pt will improve hand AROM to full composite fist.-----progressing  13 Pt will increase wrist extension by 10 degrees.      Plan:  Continue 1x week x 4 weeks.        "

## 2017-07-20 ENCOUNTER — CLINICAL SUPPORT (OUTPATIENT)
Dept: REHABILITATION | Facility: HOSPITAL | Age: 57
End: 2017-07-20
Attending: ORTHOPAEDIC SURGERY
Payer: MEDICAID

## 2017-07-20 DIAGNOSIS — M25.649 DECREASED ROM OF FINGER: ICD-10-CM

## 2017-07-20 DIAGNOSIS — M79.642 HAND PAIN, LEFT: ICD-10-CM

## 2017-07-20 PROCEDURE — 97530 THERAPEUTIC ACTIVITIES: CPT | Mod: PO

## 2017-07-26 NOTE — PROGRESS NOTES
"OT Daily Progress Note    Patient:  Claude R Morris Jr.  Clinic #:  [id number]   Date of Note: 07/26/2017   Referring Physician:  Bernie Cook III, *  Diagnosis:  S/p L zone V flexor tendon repair  1. Decreased ROM of finger     2. Hand pain, left          Start Time: 9   End Time: 10  Total Time: 60 min  Group Time: -    Visit 15 of 16 authorized    Subjective:   "It's a little less stiff and less of the migdalia horse feeling. It usually feels good after therapy."  Pain: 3 out of 10     Objective:   Patient seen by OT this session. Treatment  consist of the following:  manual therapy/joint mobilization and Therapeutic exercise    Patient received paraffin bath to L hand x 10 minutes to increase blood flow, circulation, pain management and for tissue elasticity prior to therex. Patient received ultrasound to R volar wrist/forearm scar area to increase blood flow, circulation, tissue elasticity, pain management and for wound/scar management for 8 minutes @ 3.3 Mhz, Intensity 0.8 w/cm2 at 100* duty cycle. Performed scar massage to volar wrist/forearm area for 15 minutes to decrease adhesions and improve tensile glide.  Manual therapy for stretching of digits into PIP/DIP flexion and composite flexion, composite fist, lumbrical stretching, and stretching wrist into flexion/extension x 10 min. Pt performed wrist juxticizer x 2 min for wrist ROM and strengthening. Pt instructed on and performed prayer stretch and reverse prayer stretch x 5 reps each with 10 second holds for wrist ROM and stretching. Performed hammer stretch for supination/pronation x 2 min for stretching and for UE strengthening. Pt performed highlighter rolls with wrist extension x 20 reps for tendon gliding and stretching. Reviewed HEP and modality use for pain management with patient reporting good understanding of same.     Treatment: Paraffin x 10 min, Ultrasound x 8 min, Manual therapy x 25 min, therex x 10 min    Reassessed " 6/30/17    Assessment:  Pt tolerated all therapy well today. Pt reports a decrease in stiffness and muscle fatigue since last session. Pt performing well with highlighter rolls/wrist extension. Pt reports compliance with HEP. Scar tissue in forearm becoming less prominent. Will focus therapy mostly on stretching for composite fist and scar management. Patient continues to demonstrate limitation  with  ROM, Joint mobility, Stiffness, Decreased fine motor coordination, Decreased functional use, Decreased strength and Continued pain all of which interfere with pt's vocational and leisurely pursuits. Reassess next session.      Goals: ( 8 weeks)   1)   Patient to be IND with HEP and modalities for pain management----met  2)   Assess ROM in 1-2 weeks.----met  3)   Assess  strength in 1-2 weeks.-----met  4)   Assess pinch strength in 1-2 weeks.-----met  5)   Decrease edema .2-.3 cm to increase joint mobility /flexibility for functional hand use. -----met  6)   Patient to score at 62% or more on FOTO to demonstrate improved perception of functional hand use.  7)   Decrease complaints of pain to  2 out of 10 to increase functional hand use for ADL/work/leisure activities.----met  8)   Patient to be IND with Orthotic use, wear and care precautions. ----met  9)   Pt will increase L  strength by 30# in order to use tools.-----progressing  10) Pt will increase L pinch strength 2-3 psi's for buttons and fasteners.-----progressing  11) Assess wrist extension AROM.-----met  12) Pt will improve hand AROM to full composite fist.-----progressing  13 Pt will increase wrist extension by 10 degrees.      Plan:  Continue 1x week x 4 weeks.

## 2017-07-27 ENCOUNTER — CLINICAL SUPPORT (OUTPATIENT)
Dept: REHABILITATION | Facility: HOSPITAL | Age: 57
End: 2017-07-27
Attending: ORTHOPAEDIC SURGERY
Payer: MEDICAID

## 2017-07-27 DIAGNOSIS — M25.649 DECREASED ROM OF FINGER: ICD-10-CM

## 2017-07-27 DIAGNOSIS — M79.642 HAND PAIN, LEFT: ICD-10-CM

## 2017-07-27 PROCEDURE — 97150 GROUP THERAPEUTIC PROCEDURES: CPT | Mod: PO

## 2021-10-20 DIAGNOSIS — M25.512 PAIN IN LEFT SHOULDER: Primary | ICD-10-CM

## 2021-10-25 ENCOUNTER — HOSPITAL ENCOUNTER (OUTPATIENT)
Dept: RADIOLOGY | Facility: HOSPITAL | Age: 61
Discharge: HOME | End: 2021-10-25
Attending: NURSE PRACTITIONER
Payer: MEDICAID

## 2021-10-25 DIAGNOSIS — M25.512 PAIN IN LEFT SHOULDER: ICD-10-CM

## 2021-10-25 PROCEDURE — 73200 CT SHOULDER WITHOUT CONTRAST LEFT: ICD-10-PCS | Mod: 26,LT,, | Performed by: RADIOLOGY

## 2021-10-25 PROCEDURE — 73200 CT UPPER EXTREMITY W/O DYE: CPT | Mod: TC,LT

## 2021-10-25 PROCEDURE — 73200 CT UPPER EXTREMITY W/O DYE: CPT | Mod: 26,LT,, | Performed by: RADIOLOGY

## 2022-11-04 ENCOUNTER — OFFICE VISIT (OUTPATIENT)
Dept: URGENT CARE | Facility: CLINIC | Age: 62
End: 2022-11-04
Payer: MEDICAID

## 2022-11-04 VITALS
DIASTOLIC BLOOD PRESSURE: 83 MMHG | RESPIRATION RATE: 16 BRPM | HEIGHT: 65 IN | SYSTOLIC BLOOD PRESSURE: 132 MMHG | HEART RATE: 81 BPM | BODY MASS INDEX: 28.66 KG/M2 | OXYGEN SATURATION: 96 % | WEIGHT: 172 LBS | TEMPERATURE: 100 F

## 2022-11-04 DIAGNOSIS — Z20.828 EXPOSURE TO THE FLU: ICD-10-CM

## 2022-11-04 DIAGNOSIS — J02.9 SORE THROAT: ICD-10-CM

## 2022-11-04 DIAGNOSIS — J11.1 FLU: ICD-10-CM

## 2022-11-04 DIAGNOSIS — R68.89 FLU-LIKE SYMPTOMS: Primary | ICD-10-CM

## 2022-11-04 DIAGNOSIS — F17.200 NEEDS SMOKING CESSATION EDUCATION: ICD-10-CM

## 2022-11-04 LAB
CTP QC/QA: YES
POC MOLECULAR INFLUENZA A AGN: NEGATIVE
POC MOLECULAR INFLUENZA B AGN: NEGATIVE

## 2022-11-04 PROCEDURE — 3079F DIAST BP 80-89 MM HG: CPT | Mod: CPTII,S$GLB,,

## 2022-11-04 PROCEDURE — 99213 OFFICE O/P EST LOW 20 MIN: CPT | Mod: S$GLB,,,

## 2022-11-04 PROCEDURE — 1159F PR MEDICATION LIST DOCUMENTED IN MEDICAL RECORD: ICD-10-PCS | Mod: CPTII,S$GLB,,

## 2022-11-04 PROCEDURE — 3008F BODY MASS INDEX DOCD: CPT | Mod: CPTII,S$GLB,,

## 2022-11-04 PROCEDURE — 99213 PR OFFICE/OUTPT VISIT, EST, LEVL III, 20-29 MIN: ICD-10-PCS | Mod: S$GLB,,,

## 2022-11-04 PROCEDURE — 1160F PR REVIEW ALL MEDS BY PRESCRIBER/CLIN PHARMACIST DOCUMENTED: ICD-10-PCS | Mod: CPTII,S$GLB,,

## 2022-11-04 PROCEDURE — 3079F PR MOST RECENT DIASTOLIC BLOOD PRESSURE 80-89 MM HG: ICD-10-PCS | Mod: CPTII,S$GLB,,

## 2022-11-04 PROCEDURE — 3075F PR MOST RECENT SYSTOLIC BLOOD PRESS GE 130-139MM HG: ICD-10-PCS | Mod: CPTII,S$GLB,,

## 2022-11-04 PROCEDURE — 1160F RVW MEDS BY RX/DR IN RCRD: CPT | Mod: CPTII,S$GLB,,

## 2022-11-04 PROCEDURE — 1159F MED LIST DOCD IN RCRD: CPT | Mod: CPTII,S$GLB,,

## 2022-11-04 PROCEDURE — 3075F SYST BP GE 130 - 139MM HG: CPT | Mod: CPTII,S$GLB,,

## 2022-11-04 PROCEDURE — 3008F PR BODY MASS INDEX (BMI) DOCUMENTED: ICD-10-PCS | Mod: CPTII,S$GLB,,

## 2022-11-04 PROCEDURE — 87502 POCT INFLUENZA A/B MOLECULAR: ICD-10-PCS | Mod: QW,S$GLB,,

## 2022-11-04 PROCEDURE — 87502 INFLUENZA DNA AMP PROBE: CPT | Mod: QW,S$GLB,,

## 2022-11-04 RX ORDER — PROMETHAZINE HYDROCHLORIDE AND DEXTROMETHORPHAN HYDROBROMIDE 6.25; 15 MG/5ML; MG/5ML
5 SYRUP ORAL NIGHTLY PRN
Qty: 118 ML | Refills: 0 | Status: SHIPPED | OUTPATIENT
Start: 2022-11-04 | End: 2022-11-14

## 2022-11-04 RX ORDER — OSELTAMIVIR PHOSPHATE 75 MG/1
75 CAPSULE ORAL 2 TIMES DAILY
Qty: 10 CAPSULE | Refills: 0 | Status: SHIPPED | OUTPATIENT
Start: 2022-11-04 | End: 2022-11-09

## 2022-11-04 RX ORDER — FLUTICASONE PROPIONATE 50 MCG
1 SPRAY, SUSPENSION (ML) NASAL DAILY
Qty: 9.9 ML | Refills: 0 | Status: SHIPPED | OUTPATIENT
Start: 2022-11-04

## 2022-11-04 RX ORDER — LORATADINE 10 MG/1
10 TABLET ORAL DAILY
Qty: 30 TABLET | Refills: 0 | Status: SHIPPED | OUTPATIENT
Start: 2022-11-04 | End: 2022-12-04

## 2022-11-04 NOTE — PROGRESS NOTES
"Subjective:       Patient ID: Claude R Morris Jr. is a 62 y.o. male.    Vitals:  height is 5' 5" (1.651 m) and weight is 78 kg (172 lb). His tympanic temperature is 100 °F (37.8 °C). His blood pressure is 132/83 and his pulse is 81. His respiration is 16 and oxygen saturation is 96%.     Chief Complaint: Sore Throat, Generalized Body Aches, and Chills    62-year-old male presents with complaints of scratchy throat, chills, sweating, feeling feverish and body aches that started yesterday.  Patient states that his boss has the flu and he was last exposed to him 2 days ago.  He has not taken any medication for symptoms.    Sore Throat   This is a new problem. The current episode started yesterday. The problem has been gradually worsening. There has been no fever. Pertinent negatives include no headaches. He has tried nothing for the symptoms.     Constitution: Positive for chills, sweating, fatigue and fever.   HENT:  Positive for sore throat. Negative for postnasal drip, sinus pain and sinus pressure.    Musculoskeletal:  Positive for muscle ache.   Neurological:  Negative for headaches.     Objective:      Physical Exam   Constitutional: He is oriented to person, place, and time. He appears well-developed. He is cooperative.  Non-toxic appearance. He does not appear ill. No distress.   HENT:   Head: Normocephalic and atraumatic.   Ears:   Right Ear: Hearing, tympanic membrane, external ear and ear canal normal.   Left Ear: Hearing, tympanic membrane, external ear and ear canal normal.   Nose: Nose normal. No mucosal edema, rhinorrhea, nasal deformity or congestion. No epistaxis. Right sinus exhibits no maxillary sinus tenderness and no frontal sinus tenderness. Left sinus exhibits no maxillary sinus tenderness and no frontal sinus tenderness.   Mouth/Throat: Uvula is midline and mucous membranes are normal. Mucous membranes are moist. No trismus in the jaw. Normal dentition. No uvula swelling. Posterior oropharyngeal " erythema present. No oropharyngeal exudate.   Eyes: Conjunctivae, EOM and lids are normal. Pupils are equal, round, and reactive to light. Right eye exhibits no discharge. Left eye exhibits no discharge. No scleral icterus.   Neck: Trachea normal and phonation normal. Neck supple.   Cardiovascular: Normal rate, regular rhythm, normal heart sounds and normal pulses.   Pulmonary/Chest: Effort normal and breath sounds normal. No respiratory distress.   Abdominal: Normal appearance and bowel sounds are normal. He exhibits no distension and no mass. Soft. There is no abdominal tenderness.   Musculoskeletal: Normal range of motion.         General: No deformity. Normal range of motion.   Neurological: He is alert and oriented to person, place, and time. He exhibits normal muscle tone. Coordination normal.   Skin: Skin is warm, dry, intact, not diaphoretic and not pale.   Psychiatric: His speech is normal and behavior is normal. Judgment and thought content normal.   Nursing note and vitals reviewed.      Results for orders placed or performed in visit on 11/04/22   POCT Influenza A/B MOLECULAR   Result Value Ref Range    POC Molecular Influenza A Ag Negative Negative, Not Reported    POC Molecular Influenza B Ag Negative Negative, Not Reported     Acceptable Yes        Assessment:       1. Flu-like symptoms    2. Flu    3. Sore throat    4. Exposure to the flu            Plan:       Discussed negative flu results with patient but given patient's flu exposure and flu like symptoms will treat with Tamiflu despite negative flu test.  Also discussed patient taking Zyrtec, Flonase and promethazine DM for cough.  Patient verbalized understanding and agrees to treatment plan.  Flu-like symptoms  -     oseltamivir (TAMIFLU) 75 MG capsule; Take 1 capsule (75 mg total) by mouth 2 (two) times daily. for 5 days  Dispense: 10 capsule; Refill: 0  -     loratadine (CLARITIN) 10 mg tablet; Take 1 tablet (10 mg total) by  mouth once daily.  Dispense: 30 tablet; Refill: 0  -     fluticasone propionate (FLONASE) 50 mcg/actuation nasal spray; 1 spray (50 mcg total) by Each Nostril route once daily.  Dispense: 9.9 mL; Refill: 0  -     promethazine-dextromethorphan (PROMETHAZINE-DM) 6.25-15 mg/5 mL Syrp; Take 5 mLs by mouth nightly as needed. Do not take maximum of 30mLs in 24hrs  Dispense: 118 mL; Refill: 0    Flu  -     POCT Influenza A/B MOLECULAR    Sore throat  -     Cancel: POCT Strep A, Molecular    Exposure to the flu  -     oseltamivir (TAMIFLU) 75 MG capsule; Take 1 capsule (75 mg total) by mouth 2 (two) times daily. for 5 days  Dispense: 10 capsule; Refill: 0

## 2023-06-28 ENCOUNTER — OFFICE VISIT (OUTPATIENT)
Dept: URGENT CARE | Facility: CLINIC | Age: 63
End: 2023-06-28
Payer: MEDICAID

## 2023-06-28 VITALS
RESPIRATION RATE: 18 BRPM | HEIGHT: 65 IN | OXYGEN SATURATION: 95 % | BODY MASS INDEX: 28.66 KG/M2 | TEMPERATURE: 99 F | SYSTOLIC BLOOD PRESSURE: 128 MMHG | DIASTOLIC BLOOD PRESSURE: 79 MMHG | WEIGHT: 172 LBS | HEART RATE: 68 BPM

## 2023-06-28 DIAGNOSIS — S61.511A LACERATION OF RIGHT WRIST, INITIAL ENCOUNTER: Primary | ICD-10-CM

## 2023-06-28 PROCEDURE — 99213 OFFICE O/P EST LOW 20 MIN: CPT | Mod: 25,S$GLB,,

## 2023-06-28 PROCEDURE — 90471 IMMUNIZATION ADMIN: CPT | Mod: S$GLB,,,

## 2023-06-28 PROCEDURE — 90715 TDAP VACCINE GREATER THAN OR EQUAL TO 7YO IM: ICD-10-PCS | Mod: S$GLB,,,

## 2023-06-28 PROCEDURE — 99213 PR OFFICE/OUTPT VISIT, EST, LEVL III, 20-29 MIN: ICD-10-PCS | Mod: 25,S$GLB,,

## 2023-06-28 PROCEDURE — 90471 TDAP VACCINE GREATER THAN OR EQUAL TO 7YO IM: ICD-10-PCS | Mod: S$GLB,,,

## 2023-06-28 PROCEDURE — 12002 LACERATION REPAIR: ICD-10-PCS | Mod: S$GLB,,,

## 2023-06-28 PROCEDURE — 12002 RPR S/N/AX/GEN/TRNK2.6-7.5CM: CPT | Mod: S$GLB,,,

## 2023-06-28 PROCEDURE — 90715 TDAP VACCINE 7 YRS/> IM: CPT | Mod: S$GLB,,,

## 2023-06-28 RX ORDER — IBUPROFEN 800 MG/1
800 TABLET ORAL EVERY 8 HOURS PRN
Qty: 21 TABLET | Refills: 0 | Status: SHIPPED | OUTPATIENT
Start: 2023-06-28

## 2023-06-28 RX ORDER — MUPIROCIN 20 MG/G
OINTMENT TOPICAL 3 TIMES DAILY
Qty: 15 G | Refills: 0 | Status: SHIPPED | OUTPATIENT
Start: 2023-06-28

## 2023-06-28 RX ORDER — CEPHALEXIN 500 MG/1
500 CAPSULE ORAL EVERY 6 HOURS
Qty: 20 CAPSULE | Refills: 0 | Status: SHIPPED | OUTPATIENT
Start: 2023-06-28 | End: 2023-07-03

## 2023-06-28 NOTE — PROGRESS NOTES
"Subjective:      Patient ID: Claude R Morris Jr. is a 62 y.o. male.    Vitals:  height is 5' 5" (1.651 m) and weight is 78 kg (172 lb). His oral temperature is 98.5 °F (36.9 °C). His blood pressure is 128/79 and his pulse is 68. His respiration is 18 and oxygen saturation is 95%.     Chief Complaint: Laceration    Patient states that he was at work when a "master cyclinder" cut his right wrist on his truck when he was working on it. He states that the pain is a 7/10. He states that he has applied abx ointment to the area. He denies numbness, tingling. Patient states that he thinks his last tetanus was 5 years ago but is unsure.    Laceration   The incident occurred less than 1 hour ago. Pain location: right wrist. The laceration is 3 cm in size. Injury mechanism: master cylinder of his truck\ The pain is at a severity of 7/10. The pain is moderate. The pain has been Fluctuating since onset. He reports no foreign bodies present. His tetanus status is unknown.     Constitution: Negative for chills and fever.   Musculoskeletal:  Positive for pain.   Skin:  Positive for laceration.   Neurological:  Negative for numbness and tingling.    Objective:     Physical Exam   Constitutional: He is oriented to person, place, and time.  Non-toxic appearance. He does not appear ill. No distress. normal  HENT:   Head: Normocephalic.   Abdominal: Normal appearance.   Musculoskeletal:      Right wrist: He exhibits laceration.   Neurological: no focal deficit. He is alert, oriented to person, place, and time and at baseline. He has normal motor skills and normal sensation. He displays no weakness and no tremor. No sensory deficit. Coordination normal.   Skin: Skin is not diaphoretic. Lacerations - upper ext.:  right wrist        Comments: There is a 3 cm laceration appreciated on the right wrist. Bleeding is controlled. The laceration is superficial. Sensation intact. Capillary refill, <2. Radial pulse, 2+. Patient is able to move all " fingers distal to the laceration.   Nursing note and vitals reviewed.    Media Information       Assessment:     1. Laceration of right wrist, initial encounter      Plan:   Previous notes reviewed.  Vital signs reviewed.  Discussed laceration of the right wrist, home care, tx options, and given follow up precautions.  Patient was briefed on my thought process and diagnosis.   Patient involved with the treatment plan and agreed to the plan.  Patient informed on warning signs, patient understood warning signs and to go to urgent care or ER if warning signs appear.    Patient Instructions   Please return here or go to the Emergency Department for any concerns or worsening of condition.    Please apply MUPIROCIN to the affected area.    Please take CEPHALEXIN for skin infection prevention. Please complete the full course of this antibiotics. Please take this antibiotic with food and a full glass of water.    Wound may ooze watery or bloody fluid but this should resolve  If you were prescribed antibiotics, take all antibiotic until completed.  Keep laceration clean and dry  Dressing should remain in place for 24 hours, then you may remove and cleanse daily and as needed with antibacterial soap such as Dial antibacterial or hibiclens  Avoid submerging in water  Return to clinic in 7-10 days to remove sutures  Follow up with provider if no improvement seen in 24-48 hours, go to ER for worsening of symptoms of infection:  increases redness, swelling or pain, fever, chills, purulent drainage    Please be advised that you received a tetanus vaccine today.    Please follow up with your primary care doctor or specialist as needed.    If you  smoke, please stop smoking.    Laceration of right wrist, initial encounter  -     Laceration Repair  -     (In Office Administered) Tdap Vaccine  -     mupirocin (BACTROBAN) 2 % ointment; Apply topically 3 (three) times daily.  Dispense: 15 g; Refill: 0  -     cephALEXin (KEFLEX) 500 MG  capsule; Take 1 capsule (500 mg total) by mouth every 6 (six) hours. for 5 days  Dispense: 20 capsule; Refill: 0  -     ibuprofen (ADVIL,MOTRIN) 800 MG tablet; Take 1 tablet (800 mg total) by mouth every 8 (eight) hours as needed for Pain.  Dispense: 21 tablet; Refill: 0      Tracy Clark PA-C

## 2023-06-28 NOTE — PROCEDURES
Laceration Repair    Date/Time: 6/28/2023 3:15 PM  Performed by: Tracy Clark PA-C  Authorized by: Tracy Clark PA-C   Body area: upper extremity  Location details: right wrist  Laceration length: 3 cm  Foreign bodies: no foreign bodies  Tendon involvement: none  Nerve involvement: none  Vascular damage: no  Anesthesia: local infiltration    Anesthesia:  Local Anesthetic: lidocaine 1% without epinephrine  Anesthetic total: 7 mL    Patient sedated: no  Preparation: sterile water and betadine solution for cleaning.  Amount of cleaning: standard  Debridement: none  Degree of undermining: none  Skin closure: 4-0 Prolene  Number of sutures: 4  Technique: simple  Approximation: close  Approximation difficulty: simple  Dressing: open (no dressing)  Patient tolerance: Patient tolerated the procedure well with no immediate complications

## 2023-06-28 NOTE — PATIENT INSTRUCTIONS
Please return here or go to the Emergency Department for any concerns or worsening of condition.    Please apply MUPIROCIN to the affected area.    Please take CEPHALEXIN for skin infection prevention. Please complete the full course of this antibiotics. Please take this antibiotic with food and a full glass of water.    Wound may ooze watery or bloody fluid but this should resolve  If you were prescribed antibiotics, take all antibiotic until completed.  Keep laceration clean and dry  Dressing should remain in place for 24 hours, then you may remove and cleanse daily and as needed with antibacterial soap such as Dial antibacterial or hibiclens  Avoid submerging in water  Return to clinic in 7-10 days to remove sutures  Follow up with provider if no improvement seen in 24-48 hours, go to ER for worsening of symptoms of infection:  increases redness, swelling or pain, fever, chills, purulent drainage    Please be advised that you received a tetanus vaccine today.    Please follow up with your primary care doctor or specialist as needed.    If you  smoke, please stop smoking.

## 2023-07-10 ENCOUNTER — CLINICAL SUPPORT (OUTPATIENT)
Dept: URGENT CARE | Facility: CLINIC | Age: 63
End: 2023-07-10
Payer: MEDICAID

## 2023-07-10 VITALS
BODY MASS INDEX: 28.66 KG/M2 | HEART RATE: 65 BPM | HEIGHT: 65 IN | SYSTOLIC BLOOD PRESSURE: 129 MMHG | RESPIRATION RATE: 18 BRPM | DIASTOLIC BLOOD PRESSURE: 87 MMHG | OXYGEN SATURATION: 95 % | TEMPERATURE: 99 F | WEIGHT: 172 LBS

## 2023-07-10 DIAGNOSIS — Z48.02 VISIT FOR SUTURE REMOVAL: Primary | ICD-10-CM

## 2023-07-10 PROCEDURE — 99212 PR OFFICE/OUTPT VISIT, EST, LEVL II, 10-19 MIN: ICD-10-PCS | Mod: S$GLB,,, | Performed by: NURSE PRACTITIONER

## 2023-07-10 PROCEDURE — 99212 OFFICE O/P EST SF 10 MIN: CPT | Mod: S$GLB,,, | Performed by: NURSE PRACTITIONER

## 2023-07-10 NOTE — PROCEDURES
Suture Removal    Date/Time: 7/10/2023 4:15 PM  Location procedure was performed: [facility]  Performed by: Rukhsana Mueller NP  Authorized by: Rukhsana Mueller NP   Body area: upper extremity  Location details: right wrist  Wound Appearance: well healed, normal color, no drainage, nonpurulent and clean  Sutures Removed: 4  Post-removal: bandaid applied  Facility: sutures placed in this facility  Complications: No  Estimated blood loss (mL): 0  Patient tolerance: Patient tolerated the procedure well with no immediate complications

## 2023-07-10 NOTE — PROGRESS NOTES
"Subjective:      Patient ID: Claude R Morris Jr. is a 62 y.o. male.    Vitals:  height is 5' 5" (1.651 m) and weight is 78 kg (172 lb). His oral temperature is 98.5 °F (36.9 °C). His blood pressure is 129/87 and his pulse is 65. His respiration is 18 and oxygen saturation is 95%.     Chief Complaint: Suture / Staple Removal    Pt states that he is here for suture removal.  4 sutures to right wrist placed on 6/28/23    Suture / Staple Removal  The sutures were placed 7 to 10 days ago. He tried antibiotic ointment use since the wound repair. The treatment provided significant relief. There has been no drainage from the wound. There is no redness present. There is no swelling present. There is no pain present.     Skin:  Negative for erythema.        Suture removal      Objective:     Physical Exam   Constitutional: He is oriented to person, place, and time. He appears well-developed.   HENT:   Head: Normocephalic and atraumatic. Head is without abrasion, without contusion and without laceration.   Ears:   Right Ear: External ear normal.   Left Ear: External ear normal.   Nose: Nose normal.   Mouth/Throat: Oropharynx is clear and moist and mucous membranes are normal.   Eyes: Conjunctivae, EOM and lids are normal. Pupils are equal, round, and reactive to light.   Neck: Trachea normal and phonation normal. Neck supple.   Cardiovascular: Normal rate, regular rhythm and normal heart sounds.   Pulmonary/Chest: Effort normal and breath sounds normal. No stridor. No respiratory distress.   Musculoskeletal: Normal range of motion.         General: Normal range of motion.      Left wrist: He exhibits laceration.   Neurological: He is alert and oriented to person, place, and time.   Skin: Skin is warm, dry, intact, no rash and no abscessed. Capillary refill takes less than 2 seconds. Lacerations - upper ext.:  left wristNo abrasion, No burn, No bruising, No erythema and No ecchymosis   Psychiatric: His speech is normal and " behavior is normal. Judgment and thought content normal.   Nursing note and vitals reviewed.    Assessment:     1. Visit for suture removal        Plan:       Visit for suture removal  -     Suture Removal

## 2024-09-03 NOTE — PROGRESS NOTES
"OT Daily Progress Note    Patient:  Claude R Morris Jr.  Clinic #:  [id number]   Date of Note: 06/14/2017   Referring Physician:  Bernie Cook III, *  Diagnosis:  S/p L zone V flexor tendon repair  Encounter Diagnoses   Name Primary?    Decreased ROM of finger     Hand pain, left         Start Time: 8  End Time: 9  Total Time: 60 min  Group Time: -    Visit 10 of 16 authorized  MEDICARE/DASH visit #10     Subjective:   "I don't think I need the splint anymore. It hurts more to wear it."  Pain: 3 out of 10     Objective:   Patient seen by OT this session. Treatment  consist of the following:  manual therapy/joint mobilization and Therapeutic exercise    Patient received paraffin bath to L hand x 10 minutes to increase blood flow, circulation, pain management and for tissue elasticity prior to therex. Patient received ultrasound to volar wrist scar area to increase blood flow, circulation, tissue elasticity, pain management and for wound/scar management for 8 minutes @ 3.3 Mhz, Intensity 0.8 w/cm2 at 100* duty cycle. Performed scar massage/RM to L wrist area for 5 minutes to decrease adhesions and improve tensile glide. Performed manual therapy techniques x 5 min for PROM of MP/PIP/DIP and composite/instrinsic flexion place and hold of digits and PROM of wrist into flexion and extension. Patient received fluidotherapy to L hand for 10 minutes to increase blood flow, circulation, desensitization, sensory re-education and for pain management. Pt instructed on and performed the following exercises while in fluido: tendon glides x 3 min, wrist AROM flexion/extension, RD/UD, supination/pronation, highlighter rolls, and composite fist x 2 min each. Reviewed HEP (with the addition of yellow putty squeezes, raking, and reverse raking) and modality use for pain management with patient reporting good understanding of same.     Treatment: Paraffin x 10 min, Ultrasound x 8 min, Manual therapy x 10 min, therex x 30 min   "   Reassessment 6/5/17    Assessment:  Pt will continue to benefit from skilled OT intervention. Pt instructed to discontinue splint. Pt demonstrating increased AROM in wrist, but reports that he has pain attempting to put any weight on it. Pt improving with place and hold composite fist each session. Plan to upgrade to strengthening soon. Added yellow putty to HEP for him to start squeezing and raking. Patient continues to demonstrate limitation  with  ROM, Joint mobility, Stiffness, Decreased fine motor coordination, Decreased functional use, Decreased strength and Continued pain all of which interfere with pt's vocational and leisurely pursuits.       Goals: ( 8 weeks)   1)   Patient to be IND with HEP and modalities for pain management----met  2)   Assess ROM in 1-2 weeks.----met  3)   Assess  strength in 1-2 weeks.  4)   Assess pinch strength in 1-2 weeks.  5)   Decrease edema .2-.3 cm to increase joint mobility /flexibility for functional hand use.   6)   Patient to score at 62% or more on FOTO to demonstrate improved perception of functional hand use.  7)   Decrease complaints of pain to  2 out of 10 to increase functional hand use for ADL/work/leisure activities.----met  8)   Patient to be IND with Orthotic use, wear and care precautions. ----met      Plan:  Continue 2x week x 8 weeks  during the certification period from   5/19/17 to 7/14/17  in pursuit of  OT goals.               No

## (undated) DEVICE — GLOVE SURGICAL LATEX SZ 8

## (undated) DEVICE — Device

## (undated) DEVICE — GAUZE SPONGE 4X4 12PLY

## (undated) DEVICE — SEE MEDLINE ITEM 157117

## (undated) DEVICE — SEE MEDLINE ITEM 107746

## (undated) DEVICE — SUT FIBERWIRE 4-0 18 IN TAP

## (undated) DEVICE — PAD CAST SPECIALIST STRL 4

## (undated) DEVICE — ELECTRODE REM PLYHSV RETURN 9

## (undated) DEVICE — STAPLER SKIN ROTATING HEAD

## (undated) DEVICE — CHLORAPREP W TINT 26ML APPL

## (undated) DEVICE — PAD PREP 50/CA

## (undated) DEVICE — SEE MEDLINE ITEM 152622

## (undated) DEVICE — COVER OVERHEAD SURG LT BLUE

## (undated) DEVICE — BLANKET LOWER BODY 55.9X40.2IN

## (undated) DEVICE — SEE MEDLINE ITEM 152522

## (undated) DEVICE — SOL 9P NACL IRR PIC IL

## (undated) DEVICE — PULSAVAC ZIMMER

## (undated) DEVICE — DRAPE PLASTIC U 60X72

## (undated) DEVICE — LINER GLOVE POWDERFREE 8

## (undated) DEVICE — SEE MEDLINE ITEM 152515

## (undated) DEVICE — BANDAGE ACE ELASTIC 6"

## (undated) DEVICE — BUCKET PLASTER DISPOSABLE

## (undated) DEVICE — SUT FIBERWIRE

## (undated) DEVICE — SEE MEDLINE ITEM 146345

## (undated) DEVICE — SUT 2-0 FIBERWIRE

## (undated) DEVICE — CANISTER SUCTION 2 LTR

## (undated) DEVICE — SUT 2-0 VICRYL / SH (J417)

## (undated) DEVICE — GOWN SURGICAL XX LARGE X LONG

## (undated) DEVICE — CORD FOR BIPOLAR FORCEPS 12

## (undated) DEVICE — SOL NS 1000CC

## (undated) DEVICE — TOURNIQUET SB QC DP 18X4IN

## (undated) DEVICE — SEE MEDLINE ITEM 157173